# Patient Record
Sex: MALE | Race: WHITE | NOT HISPANIC OR LATINO | URBAN - METROPOLITAN AREA
[De-identification: names, ages, dates, MRNs, and addresses within clinical notes are randomized per-mention and may not be internally consistent; named-entity substitution may affect disease eponyms.]

---

## 2018-07-19 ENCOUNTER — OUTPATIENT (OUTPATIENT)
Dept: OUTPATIENT SERVICES | Facility: HOSPITAL | Age: 68
LOS: 1 days | End: 2018-07-19
Payer: COMMERCIAL

## 2018-07-19 DIAGNOSIS — M54.5 LOW BACK PAIN: ICD-10-CM

## 2018-07-19 PROCEDURE — 64495 INJ PARAVERT F JNT L/S 3 LEV: CPT | Mod: 50

## 2018-07-19 PROCEDURE — 77003 FLUOROGUIDE FOR SPINE INJECT: CPT

## 2018-07-19 PROCEDURE — 64493 INJ PARAVERT F JNT L/S 1 LEV: CPT | Mod: 50

## 2018-07-19 PROCEDURE — 64494 INJ PARAVERT F JNT L/S 2 LEV: CPT | Mod: 50

## 2018-08-02 ENCOUNTER — TRANSCRIPTION ENCOUNTER (OUTPATIENT)
Age: 68
End: 2018-08-02

## 2019-01-24 ENCOUNTER — EMERGENCY (EMERGENCY)
Facility: HOSPITAL | Age: 69
LOS: 0 days | Discharge: HOME | End: 2019-01-24
Attending: EMERGENCY MEDICINE | Admitting: EMERGENCY MEDICINE
Payer: COMMERCIAL

## 2019-01-24 VITALS
SYSTOLIC BLOOD PRESSURE: 118 MMHG | HEART RATE: 77 BPM | RESPIRATION RATE: 13 BRPM | TEMPERATURE: 98 F | DIASTOLIC BLOOD PRESSURE: 76 MMHG | OXYGEN SATURATION: 95 %

## 2019-01-24 VITALS — HEIGHT: 64 IN | WEIGHT: 132.94 LBS

## 2019-01-24 DIAGNOSIS — Z94.84 STEM CELLS TRANSPLANT STATUS: ICD-10-CM

## 2019-01-24 DIAGNOSIS — D64.9 ANEMIA, UNSPECIFIED: ICD-10-CM

## 2019-01-24 DIAGNOSIS — C90.01 MULTIPLE MYELOMA IN REMISSION: ICD-10-CM

## 2019-01-24 DIAGNOSIS — Z88.1 ALLERGY STATUS TO OTHER ANTIBIOTIC AGENTS STATUS: ICD-10-CM

## 2019-01-24 DIAGNOSIS — J01.90 ACUTE SINUSITIS, UNSPECIFIED: ICD-10-CM

## 2019-01-24 DIAGNOSIS — R51 HEADACHE: ICD-10-CM

## 2019-01-24 LAB
ALBUMIN SERPL ELPH-MCNC: 2.9 G/DL — LOW (ref 3.3–5)
ALP SERPL-CCNC: 123 U/L — HIGH (ref 40–120)
ALT FLD-CCNC: 66 U/L — SIGNIFICANT CHANGE UP (ref 12–78)
ANION GAP SERPL CALC-SCNC: 4 MMOL/L — LOW (ref 5–17)
ANISOCYTOSIS BLD QL: SLIGHT — SIGNIFICANT CHANGE UP
APTT BLD: 25.7 SEC — LOW (ref 27.5–36.3)
AST SERPL-CCNC: 40 U/L — HIGH (ref 15–37)
BASOPHILS # BLD AUTO: 0 K/UL — SIGNIFICANT CHANGE UP (ref 0–0.2)
BASOPHILS NFR BLD AUTO: 0 % — SIGNIFICANT CHANGE UP (ref 0–2)
BILIRUB SERPL-MCNC: 0.5 MG/DL — SIGNIFICANT CHANGE UP (ref 0.2–1.2)
BUN SERPL-MCNC: 15 MG/DL — SIGNIFICANT CHANGE UP (ref 7–23)
CALCIUM SERPL-MCNC: 8.4 MG/DL — LOW (ref 8.5–10.1)
CHLORIDE SERPL-SCNC: 100 MMOL/L — SIGNIFICANT CHANGE UP (ref 96–108)
CO2 SERPL-SCNC: 31 MMOL/L — SIGNIFICANT CHANGE UP (ref 22–31)
CREAT SERPL-MCNC: 0.74 MG/DL — SIGNIFICANT CHANGE UP (ref 0.5–1.3)
EOSINOPHIL # BLD AUTO: 0.09 K/UL — SIGNIFICANT CHANGE UP (ref 0–0.5)
EOSINOPHIL NFR BLD AUTO: 2 % — SIGNIFICANT CHANGE UP (ref 0–6)
ERYTHROCYTE [SEDIMENTATION RATE] IN BLOOD: 35 MM/HR — HIGH (ref 0–20)
GLUCOSE SERPL-MCNC: 127 MG/DL — HIGH (ref 70–99)
HCT VFR BLD CALC: 39.6 % — SIGNIFICANT CHANGE UP (ref 39–50)
HGB BLD-MCNC: 13 G/DL — SIGNIFICANT CHANGE UP (ref 13–17)
INR BLD: 1.27 RATIO — HIGH (ref 0.88–1.16)
LYMPHOCYTES # BLD AUTO: 0.7 K/UL — LOW (ref 1–3.3)
LYMPHOCYTES # BLD AUTO: 16 % — SIGNIFICANT CHANGE UP (ref 13–44)
MACROCYTES BLD QL: SLIGHT — SIGNIFICANT CHANGE UP
MANUAL SMEAR VERIFICATION: SIGNIFICANT CHANGE UP
MCHC RBC-ENTMCNC: 31.8 PG — SIGNIFICANT CHANGE UP (ref 27–34)
MCHC RBC-ENTMCNC: 32.8 GM/DL — SIGNIFICANT CHANGE UP (ref 32–36)
MCV RBC AUTO: 96.8 FL — SIGNIFICANT CHANGE UP (ref 80–100)
METAMYELOCYTES # FLD: 2 % — HIGH (ref 0–0)
MICROCYTES BLD QL: SLIGHT — SIGNIFICANT CHANGE UP
MONOCYTES # BLD AUTO: 0.74 K/UL — SIGNIFICANT CHANGE UP (ref 0–0.9)
MONOCYTES NFR BLD AUTO: 17 % — HIGH (ref 2–14)
NEUTROPHILS # BLD AUTO: 2.72 K/UL — SIGNIFICANT CHANGE UP (ref 1.8–7.4)
NEUTROPHILS NFR BLD AUTO: 52 % — SIGNIFICANT CHANGE UP (ref 43–77)
NEUTS BAND # BLD: 10 % — HIGH (ref 0–8)
NRBC # BLD: 0 /100 — SIGNIFICANT CHANGE UP (ref 0–0)
NRBC # BLD: SIGNIFICANT CHANGE UP /100 WBCS (ref 0–0)
OVALOCYTES BLD QL SMEAR: SLIGHT — SIGNIFICANT CHANGE UP
PLAT MORPH BLD: NORMAL — SIGNIFICANT CHANGE UP
PLATELET # BLD AUTO: 118 K/UL — LOW (ref 150–400)
POIKILOCYTOSIS BLD QL AUTO: SLIGHT — SIGNIFICANT CHANGE UP
POTASSIUM SERPL-MCNC: 3.8 MMOL/L — SIGNIFICANT CHANGE UP (ref 3.5–5.3)
POTASSIUM SERPL-SCNC: 3.8 MMOL/L — SIGNIFICANT CHANGE UP (ref 3.5–5.3)
PROT SERPL-MCNC: 6.6 GM/DL — SIGNIFICANT CHANGE UP (ref 6–8.3)
PROTHROM AB SERPL-ACNC: 14.2 SEC — HIGH (ref 10–12.9)
RAPID RVP RESULT: SIGNIFICANT CHANGE UP
RBC # BLD: 4.09 M/UL — LOW (ref 4.2–5.8)
RBC # FLD: 13.9 % — SIGNIFICANT CHANGE UP (ref 10.3–14.5)
RBC BLD AUTO: ABNORMAL
SODIUM SERPL-SCNC: 135 MMOL/L — SIGNIFICANT CHANGE UP (ref 135–145)
VARIANT LYMPHS # BLD: 1 % — SIGNIFICANT CHANGE UP (ref 0–6)
WBC # BLD: 4.38 K/UL — SIGNIFICANT CHANGE UP (ref 3.8–10.5)
WBC # FLD AUTO: 4.38 K/UL — SIGNIFICANT CHANGE UP (ref 3.8–10.5)

## 2019-01-24 PROCEDURE — 70450 CT HEAD/BRAIN W/O DYE: CPT | Mod: 26

## 2019-01-24 PROCEDURE — 93010 ELECTROCARDIOGRAM REPORT: CPT

## 2019-01-24 PROCEDURE — 99284 EMERGENCY DEPT VISIT MOD MDM: CPT

## 2019-01-24 RX ORDER — FLUTICASONE PROPIONATE 50 MCG
1 SPRAY, SUSPENSION NASAL
Qty: 1 | Refills: 0
Start: 2019-01-24 | End: 2019-02-02

## 2019-01-24 RX ORDER — METOCLOPRAMIDE HCL 10 MG
10 TABLET ORAL ONCE
Qty: 0 | Refills: 0 | Status: COMPLETED | OUTPATIENT
Start: 2019-01-24 | End: 2019-01-24

## 2019-01-24 RX ORDER — SODIUM CHLORIDE 9 MG/ML
1000 INJECTION INTRAMUSCULAR; INTRAVENOUS; SUBCUTANEOUS ONCE
Qty: 0 | Refills: 0 | Status: COMPLETED | OUTPATIENT
Start: 2019-01-24 | End: 2019-01-24

## 2019-01-24 RX ORDER — PSEUDOEPHEDRINE HCL 30 MG
1 TABLET ORAL
Qty: 28 | Refills: 0
Start: 2019-01-24 | End: 2019-01-30

## 2019-01-24 RX ADMIN — SODIUM CHLORIDE 1000 MILLILITER(S): 9 INJECTION INTRAMUSCULAR; INTRAVENOUS; SUBCUTANEOUS at 14:30

## 2019-01-24 RX ADMIN — SODIUM CHLORIDE 1000 MILLILITER(S): 9 INJECTION INTRAMUSCULAR; INTRAVENOUS; SUBCUTANEOUS at 15:26

## 2019-01-24 RX ADMIN — Medication 10 MILLIGRAM(S): at 15:48

## 2019-01-24 NOTE — ED ADULT TRIAGE NOTE - CHIEF COMPLAINT QUOTE
Patient sent in by Dr. Trevizo for headache x 1 week.  requesting spinal tap and MRI. Patient c/o nausea, but denies vomiting, fever, blurred vision.

## 2019-01-24 NOTE — ED PROVIDER NOTE - CARE PROVIDER_API CALL
Babak Carrasquillo), Internal Medicine  [address] [place] [address]  Phone: [phone]  Fax: [phone]

## 2019-01-24 NOTE — ED PROVIDER NOTE - MUSCULOSKELETAL, MLM
Spine appears normal, range of motion is not limited, no muscle or joint tenderness. Neck supple, full ROM.

## 2019-01-24 NOTE — ED ADULT NURSE NOTE - OBJECTIVE STATEMENT
Pt complains of headache x 1 week, improved today.  Pt reports history of multiple myeloma and stem cell transplant.  Pt wearing mask.  VS WNL.  Cardiac and VS monitoring initiated.  EKG done on arrival. 20g PIV placed in L forearm.  RVP sent.

## 2019-01-24 NOTE — ED PROVIDER NOTE - PROGRESS NOTE DETAILS
Headache improved -- CT with left sided sinusitis. No intracranial path, labs unremarkable.  No meningeal signs, fever. D/W Julio Cesar-- agrees that patient's symptoms most likely related to sinusitis.  Will d/c on decongestants.  To f/u with PMD

## 2019-01-24 NOTE — ED PROVIDER NOTE - NSFOLLOWUPINSTRUCTIONS_ED_ALL_ED_FT
Flonase and Sudafed to promote sinus drainage.  Acetaminophen for pain.  See printed sinusitis instructions.   Follow-up with your primary care physician.

## 2019-07-10 ENCOUNTER — TRANSCRIPTION ENCOUNTER (OUTPATIENT)
Age: 69
End: 2019-07-10

## 2020-09-15 NOTE — ED PROVIDER NOTE - OBJECTIVE STATEMENT
67 y/o male with a PMHx of multiple myeloma in remission s/p chemotherapy, s/p stem cell transplant, chronic back pain s/p kyphoplasty on oxycodone and Dilaudid presents to the ED c/o HA x1 week. HA was gradual in onset. Pt had no relief of HA with his pain medications or with ASA. Yesterday pt saw neurologist, Dr. Harrell, who gave pt Relpax with some relief of his HA. Pt also with cough so pt is taking Mucinex. No fever, neck stiffness, confusion. Pt sent in by neuro for possibility of having a spinal tap. (4) no impairment

## 2022-08-12 ENCOUNTER — EMERGENCY (EMERGENCY)
Facility: HOSPITAL | Age: 72
LOS: 0 days | Discharge: HOME | End: 2022-08-12
Attending: EMERGENCY MEDICINE
Payer: MEDICARE

## 2022-08-12 VITALS
OXYGEN SATURATION: 97 % | HEART RATE: 94 BPM | SYSTOLIC BLOOD PRESSURE: 141 MMHG | DIASTOLIC BLOOD PRESSURE: 94 MMHG | RESPIRATION RATE: 16 BRPM

## 2022-08-12 VITALS — HEIGHT: 64 IN | WEIGHT: 134.92 LBS

## 2022-08-12 DIAGNOSIS — Z90.49 ACQUIRED ABSENCE OF OTHER SPECIFIED PARTS OF DIGESTIVE TRACT: ICD-10-CM

## 2022-08-12 DIAGNOSIS — Z88.1 ALLERGY STATUS TO OTHER ANTIBIOTIC AGENTS STATUS: ICD-10-CM

## 2022-08-12 DIAGNOSIS — C90.00 MULTIPLE MYELOMA NOT HAVING ACHIEVED REMISSION: ICD-10-CM

## 2022-08-12 DIAGNOSIS — D64.9 ANEMIA, UNSPECIFIED: ICD-10-CM

## 2022-08-12 DIAGNOSIS — Z90.89 ACQUIRED ABSENCE OF OTHER ORGANS: ICD-10-CM

## 2022-08-12 DIAGNOSIS — S61.210A LACERATION WITHOUT FOREIGN BODY OF RIGHT INDEX FINGER WITHOUT DAMAGE TO NAIL, INITIAL ENCOUNTER: ICD-10-CM
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
## 2022-08-12 DIAGNOSIS — Z88.8 ALLERGY STATUS TO OTHER DRUGS, MEDICAMENTS AND BIOLOGICAL SUBSTANCES STATUS: ICD-10-CM
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
## 2022-08-12 PROCEDURE — 12002 RPR S/N/AX/GEN/TRNK2.6-7.5CM: CPT | Mod: F6

## 2022-08-12 PROCEDURE — 12002 RPR S/N/AX/GEN/TRNK2.6-7.5CM: CPT

## 2022-08-12 PROCEDURE — 99283 EMERGENCY DEPT VISIT LOW MDM: CPT | Mod: FS,25

## 2022-08-12 PROCEDURE — 99282 EMERGENCY DEPT VISIT SF MDM: CPT | Mod: 25

## 2022-08-12 NOTE — ED STATDOCS - PATIENT PORTAL LINK FT
You can access the FollowMyHealth Patient Portal offered by [facility] by registering at the following website: [email or web address]. By joining [employer]’s FollowMyHealth portal, you will also be able to view your health information using other applications (apps) compatible with our system.

## 2022-08-12 NOTE — ED STATDOCS - NSICDXPASTSURGICALHX_GEN_ALL_CORE_FT
PAST SURGICAL HISTORY:  H/O carpal tunnel repair 10 years ago    S/P appendectomy     S/P kyphoplasty X2    S/P tonsillectomy

## 2022-08-12 NOTE — ED ADULT NURSE NOTE - CAS TRG GEN SKIN COLOR
Diagnosis: Aplastic Anemia     Protocol/Chemo Regimen: ATG/Cyclosporine/Prednisone (Previously received Solumedrol Days 1-4) Promacta on hold as of today for Grade 3 transaminitis    Day: 24    Pt endorsed: No acute complaints; stable haziness right eye     Review of Systems: Denies nausea, vomiting, diarrhea, chest pain, SOB     Pain scale: Denies     Diet: Regular, no concentrated phos     Allergies: No Known Allergies      ------------------ Normal for race Diagnosis: Aplastic Anemia     Protocol/Chemo Regimen: ATG/Cyclosporine/Prednisone (Previously received Solumedrol Days 1-4) Promacta on hold as of today for Grade 3 transaminitis    Day: 24    Pt endorsed: No acute complaints; stable haziness right eye     Review of Systems: Denies nausea, vomiting, diarrhea, chest pain, SOB     Pain scale: Denies     Diet: Regular, no concentrated phos     Allergies: No Known Allergies    ANTIMICROBIALS  acyclovir   Oral Tab/Cap 400 milliGRAM(s) Oral every 8 hours  atovaquone Suspension 750 milliGRAM(s) Oral every 12 hours  caspofungin IVPB 50 milliGRAM(s) IV Intermittent every 24 hours  levoFLOXacin  Tablet 500 milliGRAM(s) Oral every 24 hours    HEME/ONC MEDICATIONS  eltrombopag 150 milliGRAM(s) Oral daily    STANDING MEDICATIONS  antithymocyte globulin equine Skin Test 0.1 milliLiter(s) IntraDermal once  Biotene Dry Mouth Oral Rinse 5 milliLiter(s) Swish and Spit five times a day  cycloSPORINE  (SandIMMUNE) 500 milliGRAM(s) Oral every 12 hours  diphenhydrAMINE   Injectable 25 milliGRAM(s) IV Push once  famotidine    Tablet 20 milliGRAM(s) Oral two times a day  hydrocortisone sodium succinate Injectable 50 milliGRAM(s) IV Push once  magnesium sulfate  IVPB 2 Gram(s) IV Intermittent once  predniSONE   Tablet 10 milliGRAM(s) Oral daily  sucralfate suspension 1 Gram(s) Oral every 6 hours    PRN MEDICATIONS  acetaminophen   Tablet .. 650 milliGRAM(s) Oral every 6 hours PRN  aluminum hydroxide/magnesium hydroxide/simethicone Suspension 30 milliLiter(s) Oral every 4 hours PRN  diphenhydrAMINE   Injectable 25 milliGRAM(s) IV Push every 12 hours PRN  EPINEPHrine     1 mG/mL Injectable 0.3 milliGRAM(s) IntraMuscular once PRN  hydrocortisone sodium succinate Injectable 50 milliGRAM(s) IV Push every 12 hours PRN  methylPREDNISolone sodium succinate IVPB 500 milliGRAM(s) IV Intermittent once PRN  ondansetron Injectable 4 milliGRAM(s) IV Push every 6 hours PRN    Vital Signs Last 24 Hrs  T(C): 36.4 (16 May 2020 05:29), Max: 36.4 (15 May 2020 22:25)  T(F): 97.5 (16 May 2020 05:29), Max: 97.5 (15 May 2020 22:25)  HR: 84 (16 May 2020 05:29) (74 - 89)  BP: 130/81 (16 May 2020 05:29) (127/74 - 150/80)  BP(mean): --  RR: 18 (16 May 2020 05:29) (17 - 18)  SpO2: 98% (16 May 2020 05:29) (96% - 99%)    PHYSICAL EXAM  General: adult in NAD  HEENT: clear oropharynx, no erythema, no ulcers  Neck: supple  CV: normal S1, S2, RRR  Lungs: clear to auscultation, no wheezes, no rales  Abdomen: soft, nontender, nondistended, normal BS  Ext: no edema  Skin: no rash  Neuro: alert and oriented x 3  Central line: normal     LABS:                        7.0    1.80  )-----------( 46       ( 16 May 2020 07:05 )             20.3     Mean Cell Volume : 85.7 fl  Mean Cell Hemoglobin : 29.5 pg  Mean Cell Hemoglobin Concentration : 34.5 gm/dL  Auto Neutrophil # : 0.06 K/uL  Auto Lymphocyte # : 1.70 K/uL  Auto Monocyte # : 0.03 K/uL  Auto Eosinophil # : 0.00 K/uL  Auto Basophil # : 0.00 K/uL  Auto Neutrophil % : 3.5 %  Auto Lymphocyte % : 94.7 %  Auto Monocyte % : 1.8 %  Auto Eosinophil % : 0.0 %  Auto Basophil % : 0.0 %    05-16  137  |  102  |  18  ----------------------------<  96  4.1   |  25  |  0.87    Ca    8.8      16 May 2020 07:04  Phos  4.8     05-16  Mg     1.4     05-16    TPro  6.0  /  Alb  3.3  /  TBili  2.2<H>  /  DBili  1.1<H>  /  AST  89<H>  /  ALT  190<H>  /  AlkPhos  96  05-16    Mg 1.4  Phos 4.8      Uric Acid 2.6    RADIOLOGY & ADDITIONAL STUDIES:  < from: US Abdomen Upper Quadrant Right (05.04.20 @ 09:45) >  IMPRESSION:   Normal right upper quadrant abdominal ultrasound. Diagnosis: Aplastic Anemia     Protocol/Chemo Regimen: ATG/Cyclosporine/Prednisone (Previously received Solumedrol Days 1-4) Promacta on hold as of today for Grade 3 transaminitis    Day: 24    Pt endorsed: No acute complaints     Review of Systems: Denies nausea, vomiting, diarrhea, chest pain, SOB     Pain scale: Denies     Diet: Regular, no concentrated phos     Allergies: No Known Allergies    ANTIMICROBIALS  acyclovir   Oral Tab/Cap 400 milliGRAM(s) Oral every 8 hours  atovaquone Suspension 750 milliGRAM(s) Oral every 12 hours  caspofungin IVPB 50 milliGRAM(s) IV Intermittent every 24 hours  levoFLOXacin  Tablet 500 milliGRAM(s) Oral every 24 hours    HEME/ONC MEDICATIONS  eltrombopag 150 milliGRAM(s) Oral daily    STANDING MEDICATIONS  antithymocyte globulin equine Skin Test 0.1 milliLiter(s) IntraDermal once  Biotene Dry Mouth Oral Rinse 5 milliLiter(s) Swish and Spit five times a day  cycloSPORINE  (SandIMMUNE) 500 milliGRAM(s) Oral every 12 hours  diphenhydrAMINE   Injectable 25 milliGRAM(s) IV Push once  famotidine    Tablet 20 milliGRAM(s) Oral two times a day  hydrocortisone sodium succinate Injectable 50 milliGRAM(s) IV Push once  magnesium sulfate  IVPB 2 Gram(s) IV Intermittent once  predniSONE   Tablet 10 milliGRAM(s) Oral daily  sucralfate suspension 1 Gram(s) Oral every 6 hours    PRN MEDICATIONS  acetaminophen   Tablet .. 650 milliGRAM(s) Oral every 6 hours PRN  aluminum hydroxide/magnesium hydroxide/simethicone Suspension 30 milliLiter(s) Oral every 4 hours PRN  diphenhydrAMINE   Injectable 25 milliGRAM(s) IV Push every 12 hours PRN  EPINEPHrine     1 mG/mL Injectable 0.3 milliGRAM(s) IntraMuscular once PRN  hydrocortisone sodium succinate Injectable 50 milliGRAM(s) IV Push every 12 hours PRN  methylPREDNISolone sodium succinate IVPB 500 milliGRAM(s) IV Intermittent once PRN  ondansetron Injectable 4 milliGRAM(s) IV Push every 6 hours PRN    Vital Signs Last 24 Hrs  T(C): 36.4 (16 May 2020 05:29), Max: 36.4 (15 May 2020 22:25)  T(F): 97.5 (16 May 2020 05:29), Max: 97.5 (15 May 2020 22:25)  HR: 84 (16 May 2020 05:29) (74 - 89)  BP: 130/81 (16 May 2020 05:29) (127/74 - 150/80)  BP(mean): --  RR: 18 (16 May 2020 05:29) (17 - 18)  SpO2: 98% (16 May 2020 05:29) (96% - 99%)    PHYSICAL EXAM  General: adult in NAD  HEENT: clear oropharynx, no erythema, no ulcers  Neck: supple  CV: normal S1, S2, RRR  Lungs: clear to auscultation, no wheezes, no rales  Abdomen: soft, nontender, nondistended, normal BS  Ext: no edema  Skin: no rash  Neuro: alert and oriented x 3  Central line: normal     LABS:                        7.0    1.80  )-----------( 46       ( 16 May 2020 07:05 )             20.3     Mean Cell Volume : 85.7 fl  Mean Cell Hemoglobin : 29.5 pg  Mean Cell Hemoglobin Concentration : 34.5 gm/dL  Auto Neutrophil # : 0.06 K/uL  Auto Lymphocyte # : 1.70 K/uL  Auto Monocyte # : 0.03 K/uL  Auto Eosinophil # : 0.00 K/uL  Auto Basophil # : 0.00 K/uL  Auto Neutrophil % : 3.5 %  Auto Lymphocyte % : 94.7 %  Auto Monocyte % : 1.8 %  Auto Eosinophil % : 0.0 %  Auto Basophil % : 0.0 %    05-16  137  |  102  |  18  ----------------------------<  96  4.1   |  25  |  0.87    Ca    8.8      16 May 2020 07:04  Phos  4.8     05-16  Mg     1.4     05-16    TPro  6.0  /  Alb  3.3  /  TBili  2.2<H>  /  DBili  1.1<H>  /  AST  89<H>  /  ALT  190<H>  /  AlkPhos  96  05-16    Mg 1.4  Phos 4.8      Uric Acid 2.6    RADIOLOGY & ADDITIONAL STUDIES:  < from: US Abdomen Upper Quadrant Right (05.04.20 @ 09:45) >  IMPRESSION:   Normal right upper quadrant abdominal ultrasound. Diagnosis: Aplastic Anemia     Protocol/Chemo Regimen: ATG/Cyclosporine/Prednisone/Promacta   (Promacta was held 5/1-5/11 d/t transaminitis)     Day: 24    Pt endorsed: chest discomfort during platelet transfusion     Review of Systems: Denies nausea, vomiting, diarrhea, chest pain, SOB     Pain scale: Denies     Diet: Regular, no concentrated phos     Allergies: No Known Allergies    ANTIMICROBIALS  acyclovir   Oral Tab/Cap 400 milliGRAM(s) Oral every 8 hours  atovaquone Suspension 750 milliGRAM(s) Oral every 12 hours  caspofungin IVPB 50 milliGRAM(s) IV Intermittent every 24 hours  levoFLOXacin  Tablet 500 milliGRAM(s) Oral every 24 hours    HEME/ONC MEDICATIONS  eltrombopag 150 milliGRAM(s) Oral daily    STANDING MEDICATIONS  antithymocyte globulin equine Skin Test 0.1 milliLiter(s) IntraDermal once  Biotene Dry Mouth Oral Rinse 5 milliLiter(s) Swish and Spit five times a day  cycloSPORINE  (SandIMMUNE) 500 milliGRAM(s) Oral every 12 hours  diphenhydrAMINE   Injectable 25 milliGRAM(s) IV Push once  famotidine    Tablet 20 milliGRAM(s) Oral two times a day  hydrocortisone sodium succinate Injectable 50 milliGRAM(s) IV Push once  magnesium sulfate  IVPB 2 Gram(s) IV Intermittent once  predniSONE   Tablet 10 milliGRAM(s) Oral daily  sucralfate suspension 1 Gram(s) Oral every 6 hours    PRN MEDICATIONS  acetaminophen   Tablet .. 650 milliGRAM(s) Oral every 6 hours PRN  aluminum hydroxide/magnesium hydroxide/simethicone Suspension 30 milliLiter(s) Oral every 4 hours PRN  diphenhydrAMINE   Injectable 25 milliGRAM(s) IV Push every 12 hours PRN  EPINEPHrine     1 mG/mL Injectable 0.3 milliGRAM(s) IntraMuscular once PRN  hydrocortisone sodium succinate Injectable 50 milliGRAM(s) IV Push every 12 hours PRN  methylPREDNISolone sodium succinate IVPB 500 milliGRAM(s) IV Intermittent once PRN  ondansetron Injectable 4 milliGRAM(s) IV Push every 6 hours PRN    Vital Signs Last 24 Hrs  T(C): 36.4 (16 May 2020 05:29), Max: 36.4 (15 May 2020 22:25)  T(F): 97.5 (16 May 2020 05:29), Max: 97.5 (15 May 2020 22:25)  HR: 84 (16 May 2020 05:29) (74 - 89)  BP: 130/81 (16 May 2020 05:29) (127/74 - 150/80)  BP(mean): --  RR: 18 (16 May 2020 05:29) (17 - 18)  SpO2: 98% (16 May 2020 05:29) (96% - 99%)    PHYSICAL EXAM  General: adult in NAD  HEENT: clear oropharynx, no erythema, no ulcers  Neck: supple  CV: normal S1, S2, RRR  Lungs: clear to auscultation, no wheezes, no rales  Abdomen: soft, nontender, nondistended, normal BS  Ext: no edema  Skin: no rash  Neuro: alert and oriented x 3  Central line: normal     LABS:                        7.0    1.80  )-----------( 46       ( 16 May 2020 07:05 )             20.3     Mean Cell Volume : 85.7 fl  Mean Cell Hemoglobin : 29.5 pg  Mean Cell Hemoglobin Concentration : 34.5 gm/dL  Auto Neutrophil # : 0.06 K/uL  Auto Lymphocyte # : 1.70 K/uL  Auto Monocyte # : 0.03 K/uL  Auto Eosinophil # : 0.00 K/uL  Auto Basophil # : 0.00 K/uL  Auto Neutrophil % : 3.5 %  Auto Lymphocyte % : 94.7 %  Auto Monocyte % : 1.8 %  Auto Eosinophil % : 0.0 %  Auto Basophil % : 0.0 %    05-16  137  |  102  |  18  ----------------------------<  96  4.1   |  25  |  0.87    Ca    8.8      16 May 2020 07:04  Phos  4.8     05-16  Mg     1.4     05-16    TPro  6.0  /  Alb  3.3  /  TBili  2.2<H>  /  DBili  1.1<H>  /  AST  89<H>  /  ALT  190<H>  /  AlkPhos  96  05-16    Mg 1.4  Phos 4.8      Uric Acid 2.6    RADIOLOGY & ADDITIONAL STUDIES:  < from: US Abdomen Upper Quadrant Right (05.04.20 @ 09:45) >  IMPRESSION:   Normal right upper quadrant abdominal ultrasound.

## 2022-08-12 NOTE — ED STATDOCS - OBJECTIVE STATEMENT
71 y/o male presents to the ED c/o lac to R index finger. Pt was cutting with knife which slipped, cutting R index finger. No other complaints. 73 y/o male presents to the ED c/o lac to R index finger. Pt state was cutting with a knife which slipped, cutting R index finger. No other complaints.

## 2022-08-12 NOTE — ED ADULT TRIAGE NOTE - CHIEF COMPLAINT QUOTE
laceration to pointer finger on right hand, cut with large knife. pt not on anticoagulants. arrived with finger wrapped. finger unwrapped by triage RN, approx. 3 inch laceration noted actively bleeding. wound re-dressed and wrapped.

## 2022-08-12 NOTE — ED STATDOCS - PROGRESS NOTE DETAILS
71 yo male presents with R index finger laceration. Pt was sharpening his knife and is unsure if he cut it on the shrpener or the knife. Last tdap unknown; however, due to his medical conditions and him being immunocompromised, his doctors are slowly giving him vaccines and he would prefer to have his doctor give him the vaccines. Laceration over the dorsal aspect of the R index finger, FROM of the PIP, DIP and MCP if the affected finger. Will repair lac and d/c home. -Louis Mullins PA-C

## 2022-08-12 NOTE — ED STATDOCS - NSFOLLOWUPINSTRUCTIONS_ED_ALL_ED_FT
Laceration    WHAT YOU NEED TO KNOW:    A laceration is an injury to the skin and the soft tissue underneath it. Lacerations happen when you are cut or hit by something. They can happen anywhere on the body.     DISCHARGE INSTRUCTIONS:    Return to the emergency department if:     You have heavy bleeding or bleeding that does not stop after 10 minutes of holding firm, direct pressure over the wound.       Your wound opens up.     Contact your healthcare provider if:     You have a fever or chills.       Your laceration is red, warm, or swollen.      You have red streaks on your skin coming from your wound.      You have white or yellow drainage from the wound that smells bad.      You have pain that gets worse, even after treatment.       You have questions or concerns about your condition or care.     Medicines:     Prescription pain medicine may be given. Ask how to take this medicine safely.       Antibiotics help treat or prevent a bacterial infection.       Take your medicine as directed. Contact your healthcare provider if you think your medicine is not helping or if you have side effects. Tell him or her if you are allergic to any medicine. Keep a list of the medicines, vitamins, and herbs you take. Include the amounts, and when and why you take them. Bring the list or the pill bottles to follow-up visits. Carry your medicine list with you in case of an emergency.    Care for your wound as directed:     Do not get your wound wet until your healthcare provider says it is okay. Do not soak your wound in water. Do not go swimming until your healthcare provider says it is okay. Carefully wash the wound with soap and water. Gently pat the area dry or allow it to air dry.       Change your bandages when they get wet, dirty, or after washing. Apply new, clean bandages as directed. Do not apply elastic bandages or tape too tight. Do not put powders or lotions over your incision.       Apply antibiotic ointment as directed. Your healthcare provider may give you antibiotic ointment to put over your wound if you have stitches. If you have strips of tape over your incision, let them dry up and fall off on their own. If they do not fall off within 14 days, gently remove them. If you have glue over your wound, do not remove or pick at it. If your glue comes off, do not replace it with glue that you have at home.       Check your wound every day for signs of infection such as swelling, redness, or pus.     Self-care:     Apply ice on your wound for 15 to 20 minutes every hour or as directed. Use an ice pack, or put crushed ice in a plastic bag. Cover it with a towel. Ice helps prevent tissue damage and decreases swelling and pain.      Use a splint as directed. A splint will decrease movement and stress on your wound. It may help it heal faster. A splint may be used for lacerations over joints or areas of your body that bend. Ask your healthcare provider how to apply and remove a splint.       Decrease scarring of your wound by applying ointments as directed. Do not apply ointments until your healthcare provider says it is okay. You may need to wait until your wound is healed. Ask which ointment to buy and how often to use it. After your wound is healed, use sunscreen over the area when you are out in the sun. You should do this for at least 6 months to 1 year after your injury.     Follow up with your healthcare provider as directed: You may need to follow up in 24 to 48 hours to have your wound checked for infection. You will need to return in 14 days if you have stitches or staples so they can be removed. Care for your wound as directed to prevent infection and help it heal. Write down your questions so you remember to ask them during your visits.

## 2022-08-12 NOTE — ED STATDOCS - SKIN, MLM
4cm laceration to dorsal aspect of right pointer finger. neurovascularly intact visible approx 4cm laceration to dorsal aspect of right pointer finger. neurovascularly intact

## 2022-08-12 NOTE — ED STATDOCS - NS ED ATTENDING STATEMENT MOD
This was a shared visit with the FILOMENA. I reviewed and verified the documentation and independently performed the documented:

## 2022-08-12 NOTE — ED ADULT TRIAGE NOTE - HEIGHT IN INCHES
4 Slit Excision Additional Text (Leave Blank If You Do Not Want): A linear line was drawn on the skin overlying the lesion. An incision was made slowly until the lesion was visualized.  Once visualized, the lesion was removed with blunt dissection.

## 2022-08-12 NOTE — ED STATDOCS - NSICDXFAMILYHX_GEN_ALL_CORE_FT
FAMILY HISTORY:  Mother  Still living? No  Family history of cervical cancer, Age at diagnosis: Age Unknown

## 2022-08-12 NOTE — ED STATDOCS - ATTENDING APP SHARED VISIT CONTRIBUTION OF CARE
I, Ethan Liu, performed the initial face to face bedside interview with this patient regarding history of present illness, review of symptoms and relevant past medical, social and family history.  I completed an independent physical examination.  I was the initial provider who evaluated this patient. I have signed out the follow up of any pending tests (i.e. labs, radiological studies) to the FILOMENA.  I have communicated the patient’s plan of care and disposition with the FILOMENA.  The history, relevant review of systems, past medical and surgical history, medical decision making, and physical examination was documented by the scribe in my presence and I attest to the accuracy of the documentation.     pt with lac to right index finger from accidental cut from a knife.   scant active bleeding from approx 4 cm lac .   NVI.   UTD with tetanus from PMD.   will suture and have f/u

## 2022-08-13 NOTE — ED PROCEDURE NOTE - NS ED ATTENDING STATEMENT MOD
This was a shared visit with the FILOMENA. I reviewed and verified the documentation and independently performed the documented:
This was a shared visit with the FILOMENA. I reviewed and verified the documentation and independently performed the documented:

## 2022-08-13 NOTE — ED PROCEDURE NOTE - CPROC ED TIME OUT STATEMENT1
“Patient's name, , procedure and correct site were confirmed during the [place] Timeout.”
“Patient's name, , procedure and correct site were confirmed during the [place] Timeout.”

## 2022-08-27 ENCOUNTER — EMERGENCY (EMERGENCY)
Facility: HOSPITAL | Age: 72
LOS: 0 days | Discharge: HOME | End: 2022-08-27
Attending: HOSPITALIST
Payer: MEDICARE

## 2022-08-27 VITALS
TEMPERATURE: 98 F | OXYGEN SATURATION: 97 % | RESPIRATION RATE: 17 BRPM | HEART RATE: 97 BPM | SYSTOLIC BLOOD PRESSURE: 147 MMHG | DIASTOLIC BLOOD PRESSURE: 89 MMHG

## 2022-08-27 VITALS — WEIGHT: 130.07 LBS | HEIGHT: 64 IN

## 2022-08-27 DIAGNOSIS — S61.212D LACERATION WITHOUT FOREIGN BODY OF RIGHT MIDDLE FINGER WITHOUT DAMAGE TO NAIL, SUBSEQUENT ENCOUNTER: ICD-10-CM

## 2022-08-27 DIAGNOSIS — Z48.02 ENCOUNTER FOR REMOVAL OF SUTURES: ICD-10-CM
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
## 2022-08-27 PROCEDURE — 99212 OFFICE O/P EST SF 10 MIN: CPT

## 2022-08-27 PROCEDURE — L9995: CPT

## 2022-08-27 NOTE — ED STATDOCS - OBJECTIVE STATEMENT
73 yo male w/PMHx of multiple myeloma presents to the ED for suture removal. Pt had laceration to right third digit. Stiches placed 15 days ago will remove.

## 2022-08-27 NOTE — ED STATDOCS - PATIENT PORTAL LINK FT
You can access the FollowMyHealth Patient Portal offered by [facility] by registering at the following website: [email or web address]. By joining [employer]’s FollowMyHealth portal, you will also be able to view your health information using other applications (apps) compatible with our system.

## 2022-08-27 NOTE — ED STATDOCS - NS_ ATTENDINGSCRIBEDETAILS _ED_A_ED_FT
Kim Amaya MD: The history, relevant review of systems, past medical and surgical history, medical decision making, and physical examination was documented by the scribe in my presence and I attest to the accuracy of the documentation.

## 2023-02-15 NOTE — ED ADULT NURSE NOTE - CHPI ED NUR SYMPTOMS NEG
Eliquis Approved    Filling Pharmacy: [facility]  Additional Information: Pharmacy contacted - received paid claim.  Pharmacy will contact patient when medication is ready to be picked up.            no weakness/no fever/no blurred vision/no change in level of consciousness

## 2023-05-19 ENCOUNTER — RESULT REVIEW (OUTPATIENT)
Age: 73
End: 2023-05-19

## 2023-05-19 ENCOUNTER — APPOINTMENT (OUTPATIENT)
Dept: NEUROLOGY | Facility: CLINIC | Age: 73
End: 2023-05-19
Payer: MEDICARE

## 2023-05-19 VITALS
WEIGHT: 134 LBS | TEMPERATURE: 98.5 F | BODY MASS INDEX: 22.33 KG/M2 | HEIGHT: 65 IN | SYSTOLIC BLOOD PRESSURE: 111 MMHG | HEART RATE: 88 BPM | DIASTOLIC BLOOD PRESSURE: 70 MMHG

## 2023-05-19 DIAGNOSIS — R41.3 OTHER AMNESIA: ICD-10-CM

## 2023-05-19 PROCEDURE — 99204 OFFICE O/P NEW MOD 45 MIN: CPT

## 2023-05-19 RX ORDER — DEXTROAMPHETAMINE SACCHARATE, AMPHETAMINE ASPARTATE, DEXTROAMPHETAMINE SULFATE, AND AMPHETAMINE SULFATE 2.5; 2.5; 2.5; 2.5 MG/1; MG/1; MG/1; MG/1
10 TABLET ORAL DAILY
Refills: 0 | Status: ACTIVE | COMMUNITY

## 2023-05-19 RX ORDER — METHADONE HYDROCHLORIDE 5 MG/1
TABLET ORAL 3 TIMES DAILY
Refills: 0 | Status: ACTIVE | COMMUNITY

## 2023-05-19 RX ORDER — BENDAMUSTINE HYDROCHLORIDE 25 MG/ML
INJECTION, SOLUTION INTRAVENOUS
Refills: 0 | Status: ACTIVE | COMMUNITY

## 2023-05-19 RX ORDER — ALBUTEROL 90 MCG
90 AEROSOL (GRAM) INHALATION
Refills: 0 | Status: ACTIVE | COMMUNITY

## 2023-05-19 RX ORDER — OXYCODONE HYDROCHLORIDE 30 MG/1
30 TABLET ORAL
Refills: 0 | Status: ACTIVE | COMMUNITY

## 2023-05-19 RX ORDER — ALPRAZOLAM 0.5 MG/1
0.5 TABLET ORAL
Refills: 0 | Status: ACTIVE | COMMUNITY

## 2023-05-19 RX ORDER — BORTEZOMIB 3.5 MG/1
INJECTION, POWDER, LYOPHILIZED, FOR SOLUTION INTRAVENOUS; SUBCUTANEOUS
Refills: 0 | Status: ACTIVE | COMMUNITY

## 2023-05-19 RX ORDER — CYCLOBENZAPRINE HCL 10 MG
10 TABLET ORAL DAILY
Refills: 0 | Status: ACTIVE | COMMUNITY

## 2023-05-19 RX ORDER — OMEPRAZOLE MAGNESIUM 20 MG/1
20 CAPSULE, DELAYED RELEASE ORAL
Refills: 0 | Status: ACTIVE | COMMUNITY

## 2023-05-19 RX ORDER — VALACYCLOVIR HYDROCHLORIDE 500 MG/1
500 TABLET, FILM COATED ORAL TWICE DAILY
Refills: 0 | Status: ACTIVE | COMMUNITY

## 2023-05-19 NOTE — REVIEW OF SYSTEMS
[Confused or Disoriented] : no confusion [Memory Lapses or Loss] : memory loss [Decr. Concentrating Ability] : decreased concentrating ability [Difficulty with Language] : no ~M difficulty with language [Changed Thought Patterns] : changed thought patterns [Repeating Questions] : repeated questioning about recent events [Arm Weakness] : no arm weakness [Hand Weakness] : no hand weakness [Leg Weakness] : no leg weakness [Poor Coordination] : good coordination [Difficulty Writing] : no difficulty writing [Difficulties in Speech] : no speech difficulties [Numbness] : no numbness [Abnormal Sensation] : no abnormal sensation [Seizures] : no convulsions [Fainting] : no fainting [Migraine Headache] : no migraine headache [Difficulty Walking] : no difficulty walking [Frequent Falls] : not falling

## 2023-05-19 NOTE — PHYSICAL EXAM
[Total Score ___ / 30] : the patient achieved a score of [unfilled] /30 [Date / Time ___ / 5] : date / time [unfilled] / 5 [Place ___ / 5] : place [unfilled] / 5 [Registration ___ / 3] : registration [unfilled] / 3 [Serial Sevens ___/5] : serial sevens [unfilled] / 5 [Naming 2 Objects ___ / 2] : naming two objects [unfilled] / 2 [Repeating a Sentence ___ / 1] : repeating a sentence [unfilled] / 1 [Writing a Sentence ___ / 1] : write sentence [unfilled] / 1 [3-stage Verbal Command ___ / 3] : three-stage verbal command [unfilled] / 3 [Written Command ___ / 1] : written command [unfilled] / 1 [Copy a Design ___ / 1] : copy a design [unfilled] / 1 [Recall ___ / 3] : recall [unfilled] / 3 [Person] : oriented to person [Place] : disoriented to place [Time] : disoriented to time [Concentration Intact] : normal concentrating ability [Visual Intact] : visual attention was ~T not ~L decreased [Naming Objects] : no difficulty naming common objects [Repeating Phrases] : no difficulty repeating a phrase [Writing A Sentence] : no difficulty writing a sentence [Fluency] : fluency intact [Comprehension] : comprehension intact [Reading] : reading intact [Past History] : adequate knowledge of personal past history [Cranial Nerves Optic (II)] : visual acuity intact bilaterally,  visual fields full to confrontation, pupils equal round and reactive to light [Cranial Nerves Oculomotor (III)] : extraocular motion intact [Cranial Nerves Trigeminal (V)] : facial sensation intact symmetrically [Cranial Nerves Facial (VII)] : face symmetrical [Cranial Nerves Vestibulocochlear (VIII)] : hearing was intact bilaterally [Cranial Nerves Glossopharyngeal (IX)] : tongue and palate midline [Cranial Nerves Accessory (XI - Cranial And Spinal)] : head turning and shoulder shrug symmetric [Cranial Nerves Hypoglossal (XII)] : there was no tongue deviation with protrusion [Motor Tone] : muscle tone was normal in all four extremities [Motor Strength] : muscle strength was normal in all four extremities [No Muscle Atrophy] : normal bulk in all four extremities [Motor Handedness Right-Handed] : the patient is right hand dominant [Paresis Pronator Drift Right-Sided] : no pronator drift on the right [Paresis Pronator Drift Left-Sided] : no pronator drift on the left [Sensation Tactile Decrease] : light touch was intact [Romberg's Sign] : Romberg's sign was negtive [Abnormal Walk] : normal gait [Past-pointing] : there was no past-pointing [Balance] : balance was intact [Tremor] : no tremor present [Dysdiadochokinesia Bilaterally] : not present [Coordination - Dysmetria Impaired Finger-to-Nose Bilateral] : not present [Coordination - Dysmetria Impaired Heel-to-Shin Bilateral] : not present [Plantar Reflex Right Only] : normal on the right [2+] : Ankle jerk left 2+ [Plantar Reflex Left Only] : normal on the left [Neck Appearance] : the appearance of the neck was normal [] : the neck was supple [Neck Cervical Mass (___cm)] : no neck mass was observed [Arterial Pulses Carotid] : carotid pulses were normal with no bruits [Edema] : there was no peripheral edema

## 2023-05-19 NOTE — DISCUSSION/SUMMARY
[FreeTextEntry1] : 74-year-old man with a history of multiple myeloma, chronic pain followed by pain management, has noted over the last 5 months some deterioration in ability to hold memory, increasingly forgetful less capable of doing previously learned activities such as using a tool at home.\par Examination is shows evidence of cognitive deficits, but otherwise nonfocal.\par Plan: We will order an MRI of the brain, preferably with contrast. As per patient wife was told not to use contrast so we will try to find information from his oncologist office.\par Electroencephalogram.\par Try to obtain copies of recent blood work.\par Return after the above.

## 2023-05-19 NOTE — HISTORY OF PRESENT ILLNESS
[FreeTextEntry1] : \par 73-year-old man with a history of multiple myeloma, remission, status post bone marrow transplant.  Accompanied by his wife, with complaint of memory loss difficulty retaining information since the beginning of the year.  As per him and his wife, throughout last year and no issues recalling the conversation, the topic of the conversation things medications, appointments and the beginning of the year sudden change.  No preceding trauma or illness, the only thing of note is any change in some of his pain medications to methadone.  About 2 weeks ago was switched back to previous medications, taken off methadone now back on methadone.\par Very forgetful, wife reports to her conversation and findings of repeating the same conversation again again again, given the same instructions again again again.  Is also had more difficulty doing things that he would otherwise no doing very well.  Is a retired , able to fix do anything in the home now having difficulty handling tools.  Still able to dress himself, very physically active.  To the limits of his underlying pain.\par Denies headaches, no dizzy spells, no recurrent falls no injuries.  No difficulty with language, no difficulty with reading over the understands verbal or written speech.\par No hallucinations, no trouble sleeping which she does Екатерина.  On Adderall to keep him awake.\par

## 2023-05-19 NOTE — DATA REVIEWED
[de-identified] :  EXAM:  CT BRAIN                      \par \par \par PROCEDURE DATE:  01/24/2019  \par \par \par \par INTERPRETATION:  Exam Date: 1/24/2019 2:49 PM\par \par CT head without IV contrast\par \par CLINICAL INFORMATION:  headache    \par \par TECHNIQUE: Contiguous axial sections were obtained through the head.   \par This scan was performed using automatic exposure control (radiation dose \par reduction software) to obtain a diagnostic image quality scan with \par patient dose as low as reasonably achievable.  \par \par COMPARISON: None\par \par \par FINDINGS:   \par \par There is no evidence of intraparenchymal or extraaxial hemorrhage.   \par There is no CT evidence of large vessel acute infarct. No mass effect is \par found in the brain.  No evidence of midline shift or herniation pattern.\par \par The ventricles, sulci and basal cisterns appear unremarkable.     \par \par Extensive mucosal inflammation within the ethmoid and sphenoid sinuses \par and within the visualized portion of the left maxillary sinus, with \par aerosolized secretions within the left sphenoid sinus, suggestive of an \par acute sinusitis, may be cause of patient's headache.\par \par No focal bony lesions are identified.\par \par \par IMPRESSION:   \par \par No acute intracranial findings.\par \par Extensive mucosal inflammation within the ethmoid and sphenoid sinuses \par and within the visualized portion of the left maxillary sinus, with \par aerosolized secretions within the left sphenoid sinus, suggestive of an \par acute sinusitis, may be cause of patient's headache.\par \par No focal bony lesions are identified.\par \par \par \par \par \par \par \par \par ZAHEER RIOS M.D., ATTENDING RADIOLOGIST\par This document has been electronically signed. Jan 24 2019  3:03PM\par   \par \par

## 2023-05-23 ENCOUNTER — APPOINTMENT (OUTPATIENT)
Dept: NEUROLOGY | Facility: CLINIC | Age: 73
End: 2023-05-23
Payer: MEDICARE

## 2023-05-23 PROCEDURE — 95816 EEG AWAKE AND DROWSY: CPT

## 2023-05-31 ENCOUNTER — APPOINTMENT (OUTPATIENT)
Dept: MRI IMAGING | Facility: CLINIC | Age: 73
End: 2023-05-31
Payer: MEDICARE

## 2023-05-31 ENCOUNTER — OUTPATIENT (OUTPATIENT)
Dept: OUTPATIENT SERVICES | Facility: HOSPITAL | Age: 73
LOS: 1 days | End: 2023-05-31
Payer: MEDICARE

## 2023-05-31 DIAGNOSIS — R41.3 OTHER AMNESIA: ICD-10-CM

## 2023-05-31 PROCEDURE — 70553 MRI BRAIN STEM W/O & W/DYE: CPT | Mod: 26,MH

## 2023-05-31 PROCEDURE — 70553 MRI BRAIN STEM W/O & W/DYE: CPT

## 2023-05-31 PROCEDURE — A9585: CPT

## 2023-06-01 ENCOUNTER — NON-APPOINTMENT (OUTPATIENT)
Age: 73
End: 2023-06-01

## 2023-07-24 ENCOUNTER — APPOINTMENT (OUTPATIENT)
Dept: NEUROLOGY | Facility: CLINIC | Age: 73
End: 2023-07-24
Payer: MEDICARE

## 2023-07-24 VITALS
HEART RATE: 87 BPM | TEMPERATURE: 97.6 F | BODY MASS INDEX: 24.85 KG/M2 | DIASTOLIC BLOOD PRESSURE: 74 MMHG | WEIGHT: 142 LBS | SYSTOLIC BLOOD PRESSURE: 134 MMHG | HEIGHT: 63.5 IN

## 2023-07-24 PROCEDURE — 99213 OFFICE O/P EST LOW 20 MIN: CPT

## 2023-07-24 NOTE — DATA REVIEWED
[de-identified] : EEG performed May 23, 2023 impression: Abnormal EEG performed with patient awake, drowsy is a diffuse renal dysfunction.

## 2023-07-24 NOTE — DISCUSSION/SUMMARY
[FreeTextEntry1] : 74-year-old man history of myeloma, with worsening memory, evidence of mild cognitive dysfunction.  Recent MRI of the brain unrevealing.  EEG mildly slow.\par Plan: We will order a brain PET scan rule out Alzheimer's.\par Computerized based cognitive study.\par Return after the above.

## 2023-07-24 NOTE — HISTORY OF PRESENT ILLNESS
[FreeTextEntry1] : 73-year-old male, here by his wife.  Noted since beginning of the year, worsening memory, forgetful, repeating questions, and attempted.  Recent evaluation including an MRI of the brain showed microvascular changes.  No acute changes.  EEG demonstrated mild slowing.  No epileptiform activity.\par No fears of blackouts, no episodes of disorientation or recurrent seizures.\par Denies any transient episodes of unilateral weakness numbness of the face or extremities, no change in vision.  Denies any hallucinations, no increasing anxiety or depression.

## 2023-07-24 NOTE — PHYSICAL EXAM
[General Appearance - Alert] : alert [General Appearance - In No Acute Distress] : in no acute distress [Person] : oriented to person [Place] : disoriented to place [Time] : disoriented to time [Concentration Intact] : normal concentrating ability [Visual Intact] : visual attention was ~T not ~L decreased [Naming Objects] : no difficulty naming common objects [Repeating Phrases] : no difficulty repeating a phrase [Writing A Sentence] : no difficulty writing a sentence [Fluency] : fluency intact [Comprehension] : comprehension intact [Reading] : reading intact [Past History] : adequate knowledge of personal past history [Total Score ___ / 30] : the patient achieved a score of [unfilled] /30 [Date / Time ___ / 5] : date / time [unfilled] / 5 [Place ___ / 5] : place [unfilled] / 5 [Registration ___ / 3] : registration [unfilled] / 3 [Serial Sevens ___/5] : serial sevens [unfilled] / 5 [Naming 2 Objects ___ / 2] : naming two objects [unfilled] / 2 [Repeating a Sentence ___ / 1] : repeating a sentence [unfilled] / 1 [Writing a Sentence ___ / 1] : write sentence [unfilled] / 1 [3-stage Verbal Command ___ / 3] : three-stage verbal command [unfilled] / 3 [Written Command ___ / 1] : written command [unfilled] / 1 [Copy a Design ___ / 1] : copy a design [unfilled] / 1 [Recall ___ / 3] : recall [unfilled] / 3 [Cranial Nerves Optic (II)] : visual acuity intact bilaterally,  visual fields full to confrontation, pupils equal round and reactive to light [Cranial Nerves Oculomotor (III)] : extraocular motion intact [Cranial Nerves Trigeminal (V)] : facial sensation intact symmetrically [Cranial Nerves Facial (VII)] : face symmetrical [Cranial Nerves Vestibulocochlear (VIII)] : hearing was intact bilaterally [Cranial Nerves Accessory (XI - Cranial And Spinal)] : head turning and shoulder shrug symmetric [Cranial Nerves Hypoglossal (XII)] : there was no tongue deviation with protrusion [Motor Tone] : muscle tone was normal in all four extremities [Motor Strength] : muscle strength was normal in all four extremities [No Muscle Atrophy] : normal bulk in all four extremities [Motor Handedness Right-Handed] : the patient is right hand dominant [Paresis Pronator Drift Right-Sided] : no pronator drift on the right [Paresis Pronator Drift Left-Sided] : no pronator drift on the left [Sensation Tactile Decrease] : light touch was intact [Romberg's Sign] : Romberg's sign was negtive [Abnormal Walk] : normal gait [Balance] : balance was intact [Past-pointing] : there was no past-pointing [Tremor] : no tremor present [Dysdiadochokinesia Bilaterally] : not present [Coordination - Dysmetria Impaired Finger-to-Nose Bilateral] : not present [Coordination - Dysmetria Impaired Heel-to-Shin Bilateral] : not present

## 2023-07-24 NOTE — REVIEW OF SYSTEMS
[As Noted in HPI] : as noted in HPI [Memory Lapses or Loss] : memory loss [Decr. Concentrating Ability] : decreased concentrating ability [Changed Thought Patterns] : changed thought patterns [Negative] : Heme/Lymph

## 2023-09-22 ENCOUNTER — APPOINTMENT (OUTPATIENT)
Dept: NUCLEAR MEDICINE | Facility: CLINIC | Age: 73
End: 2023-09-22
Payer: MEDICARE

## 2023-09-22 ENCOUNTER — RESULT REVIEW (OUTPATIENT)
Age: 73
End: 2023-09-22

## 2023-09-22 ENCOUNTER — APPOINTMENT (OUTPATIENT)
Dept: MRI IMAGING | Facility: CLINIC | Age: 73
End: 2023-09-22
Payer: MEDICARE

## 2023-09-22 ENCOUNTER — OUTPATIENT (OUTPATIENT)
Dept: OUTPATIENT SERVICES | Facility: HOSPITAL | Age: 73
LOS: 1 days | End: 2023-09-22
Payer: MEDICARE

## 2023-09-22 DIAGNOSIS — R41.3 OTHER AMNESIA: ICD-10-CM

## 2023-09-22 PROCEDURE — 70553 MRI BRAIN STEM W/O & W/DYE: CPT | Mod: 26,MH

## 2023-09-22 PROCEDURE — A9552: CPT

## 2023-09-22 PROCEDURE — A9585: CPT

## 2023-09-22 PROCEDURE — 78999 UNLISTED MISC PX DX NUC MED: CPT

## 2023-09-22 PROCEDURE — 70553 MRI BRAIN STEM W/O & W/DYE: CPT

## 2023-09-22 PROCEDURE — 78608 BRAIN IMAGING (PET): CPT

## 2023-09-22 PROCEDURE — 78999 UNLISTED MISC PX DX NUC MED: CPT | Mod: 26,MH

## 2023-09-22 PROCEDURE — 78608 BRAIN IMAGING (PET): CPT | Mod: 26,MH

## 2023-10-16 ENCOUNTER — APPOINTMENT (OUTPATIENT)
Dept: NEUROLOGY | Facility: CLINIC | Age: 73
End: 2023-10-16
Payer: MEDICARE

## 2023-10-16 VITALS
SYSTOLIC BLOOD PRESSURE: 127 MMHG | BODY MASS INDEX: 24.41 KG/M2 | WEIGHT: 143 LBS | HEART RATE: 94 BPM | HEIGHT: 64 IN | DIASTOLIC BLOOD PRESSURE: 74 MMHG

## 2023-10-16 DIAGNOSIS — F02.80 ALZHEIMER'S DISEASE, UNSPECIFIED: ICD-10-CM

## 2023-10-16 DIAGNOSIS — G30.9 ALZHEIMER'S DISEASE, UNSPECIFIED: ICD-10-CM

## 2023-10-16 PROCEDURE — 99214 OFFICE O/P EST MOD 30 MIN: CPT

## 2024-04-15 NOTE — ED STATDOCS - NSICDXPASTSURGICALHX_GEN_ALL_CORE_FT
16-Apr-2024 02:30
PAST SURGICAL HISTORY:  H/O carpal tunnel repair 10 years ago    S/P appendectomy     S/P kyphoplasty X2    S/P tonsillectomy

## 2024-04-29 ENCOUNTER — INPATIENT (INPATIENT)
Facility: HOSPITAL | Age: 74
LOS: 1 days | Discharge: HOME | DRG: 871 | End: 2024-05-01
Attending: STUDENT IN AN ORGANIZED HEALTH CARE EDUCATION/TRAINING PROGRAM | Admitting: INTERNAL MEDICINE
Payer: MEDICARE

## 2024-04-29 VITALS
TEMPERATURE: 101 F | DIASTOLIC BLOOD PRESSURE: 73 MMHG | WEIGHT: 153.88 LBS | OXYGEN SATURATION: 94 % | RESPIRATION RATE: 18 BRPM | SYSTOLIC BLOOD PRESSURE: 132 MMHG | HEART RATE: 118 BPM

## 2024-04-29 DIAGNOSIS — J18.9 PNEUMONIA, UNSPECIFIED ORGANISM: ICD-10-CM

## 2024-04-29 LAB
ALBUMIN SERPL ELPH-MCNC: 3.4 G/DL — SIGNIFICANT CHANGE UP (ref 3.3–5)
ALP SERPL-CCNC: 71 U/L — SIGNIFICANT CHANGE UP (ref 40–120)
ALT FLD-CCNC: 28 U/L — SIGNIFICANT CHANGE UP (ref 12–78)
ANION GAP SERPL CALC-SCNC: 5 MMOL/L — SIGNIFICANT CHANGE UP (ref 5–17)
ANISOCYTOSIS BLD QL: SLIGHT — SIGNIFICANT CHANGE UP
APPEARANCE UR: CLEAR — SIGNIFICANT CHANGE UP
APTT BLD: 20.9 SEC — LOW (ref 24.5–35.6)
AST SERPL-CCNC: 36 U/L — SIGNIFICANT CHANGE UP (ref 15–37)
BASE EXCESS BLDV CALC-SCNC: -2.7 MMOL/L — LOW (ref -2–3)
BASOPHILS # BLD AUTO: 0 K/UL — SIGNIFICANT CHANGE UP (ref 0–0.2)
BASOPHILS NFR BLD AUTO: 0 % — SIGNIFICANT CHANGE UP (ref 0–2)
BILIRUB SERPL-MCNC: 0.3 MG/DL — SIGNIFICANT CHANGE UP (ref 0.2–1.2)
BILIRUB UR-MCNC: NEGATIVE — SIGNIFICANT CHANGE UP
BUN SERPL-MCNC: 21 MG/DL — SIGNIFICANT CHANGE UP (ref 7–23)
CALCIUM SERPL-MCNC: 8.9 MG/DL — SIGNIFICANT CHANGE UP (ref 8.5–10.1)
CHLORIDE SERPL-SCNC: 105 MMOL/L — SIGNIFICANT CHANGE UP (ref 96–108)
CO2 SERPL-SCNC: 26 MMOL/L — SIGNIFICANT CHANGE UP (ref 22–31)
COLOR SPEC: YELLOW — SIGNIFICANT CHANGE UP
CREAT SERPL-MCNC: 0.92 MG/DL — SIGNIFICANT CHANGE UP (ref 0.5–1.3)
CRP SERPL-MCNC: 21 MG/L — HIGH
D DIMER BLD IA.RAPID-MCNC: 3337 NG/ML DDU — HIGH
DACRYOCYTES BLD QL SMEAR: SLIGHT — SIGNIFICANT CHANGE UP
DIFF PNL FLD: NEGATIVE — SIGNIFICANT CHANGE UP
EGFR: 88 ML/MIN/1.73M2 — SIGNIFICANT CHANGE UP
EOSINOPHIL # BLD AUTO: 0 K/UL — SIGNIFICANT CHANGE UP (ref 0–0.5)
EOSINOPHIL NFR BLD AUTO: 0 % — SIGNIFICANT CHANGE UP (ref 0–6)
FERRITIN SERPL-MCNC: 1125 NG/ML — HIGH (ref 30–400)
FLUAV AG NPH QL: SIGNIFICANT CHANGE UP
FLUBV AG NPH QL: SIGNIFICANT CHANGE UP
GAS PNL BLDV: SIGNIFICANT CHANGE UP
GLUCOSE SERPL-MCNC: 133 MG/DL — HIGH (ref 70–99)
GLUCOSE UR QL: NEGATIVE MG/DL — SIGNIFICANT CHANGE UP
HCO3 BLDV-SCNC: 24 MMOL/L — SIGNIFICANT CHANGE UP (ref 22–29)
HCT VFR BLD CALC: 31.9 % — LOW (ref 39–50)
HGB BLD-MCNC: 9.9 G/DL — LOW (ref 13–17)
INR BLD: 0.99 RATIO — SIGNIFICANT CHANGE UP (ref 0.85–1.18)
KETONES UR-MCNC: NEGATIVE MG/DL — SIGNIFICANT CHANGE UP
LACTATE SERPL-SCNC: 1.4 MMOL/L — SIGNIFICANT CHANGE UP (ref 0.7–2)
LACTATE SERPL-SCNC: 1.9 MMOL/L — SIGNIFICANT CHANGE UP (ref 0.7–2)
LDH SERPL L TO P-CCNC: 201 U/L — SIGNIFICANT CHANGE UP (ref 84–241)
LEUKOCYTE ESTERASE UR-ACNC: NEGATIVE — SIGNIFICANT CHANGE UP
LYMPHOCYTES # BLD AUTO: 0.31 K/UL — LOW (ref 1–3.3)
LYMPHOCYTES # BLD AUTO: 5 % — LOW (ref 13–44)
MACROCYTES BLD QL: SLIGHT — SIGNIFICANT CHANGE UP
MAGNESIUM SERPL-MCNC: 1.8 MG/DL — SIGNIFICANT CHANGE UP (ref 1.6–2.6)
MANUAL SMEAR VERIFICATION: SIGNIFICANT CHANGE UP
MCHC RBC-ENTMCNC: 28.9 PG — SIGNIFICANT CHANGE UP (ref 27–34)
MCHC RBC-ENTMCNC: 31 GM/DL — LOW (ref 32–36)
MCV RBC AUTO: 93 FL — SIGNIFICANT CHANGE UP (ref 80–100)
MICROCYTES BLD QL: SLIGHT — SIGNIFICANT CHANGE UP
MONOCYTES # BLD AUTO: 0.37 K/UL — SIGNIFICANT CHANGE UP (ref 0–0.9)
MONOCYTES NFR BLD AUTO: 6 % — SIGNIFICANT CHANGE UP (ref 2–14)
NEUTROPHILS # BLD AUTO: 5.46 K/UL — SIGNIFICANT CHANGE UP (ref 1.8–7.4)
NEUTROPHILS NFR BLD AUTO: 76 % — SIGNIFICANT CHANGE UP (ref 43–77)
NEUTS BAND # BLD: 13 % — HIGH (ref 0–8)
NITRITE UR-MCNC: NEGATIVE — SIGNIFICANT CHANGE UP
NRBC # BLD: 0 /100 WBCS — SIGNIFICANT CHANGE UP (ref 0–0)
NRBC # BLD: SIGNIFICANT CHANGE UP /100 WBCS (ref 0–0)
PCO2 BLDV: 51 MMHG — SIGNIFICANT CHANGE UP (ref 42–55)
PH BLDV: 7.29 — LOW (ref 7.32–7.43)
PH UR: 6 — SIGNIFICANT CHANGE UP (ref 5–8)
PHOSPHATE SERPL-MCNC: 3 MG/DL — SIGNIFICANT CHANGE UP (ref 2.5–4.5)
PLAT MORPH BLD: NORMAL — SIGNIFICANT CHANGE UP
PLATELET # BLD AUTO: 151 K/UL — SIGNIFICANT CHANGE UP (ref 150–400)
PO2 BLDV: 84 MMHG — HIGH (ref 25–45)
POIKILOCYTOSIS BLD QL AUTO: SLIGHT — SIGNIFICANT CHANGE UP
POTASSIUM SERPL-MCNC: 3.8 MMOL/L — SIGNIFICANT CHANGE UP (ref 3.5–5.3)
POTASSIUM SERPL-SCNC: 3.8 MMOL/L — SIGNIFICANT CHANGE UP (ref 3.5–5.3)
PROCALCITONIN SERPL-MCNC: 9.85 NG/ML — HIGH (ref 0.02–0.1)
PROT SERPL-MCNC: 7.1 GM/DL — SIGNIFICANT CHANGE UP (ref 6–8.3)
PROT UR-MCNC: NEGATIVE MG/DL — SIGNIFICANT CHANGE UP
PROTHROM AB SERPL-ACNC: 11.2 SEC — SIGNIFICANT CHANGE UP (ref 9.5–13)
RBC # BLD: 3.43 M/UL — LOW (ref 4.2–5.8)
RBC # FLD: 17.1 % — HIGH (ref 10.3–14.5)
RBC BLD AUTO: SIGNIFICANT CHANGE UP
RSV RNA NPH QL NAA+NON-PROBE: SIGNIFICANT CHANGE UP
SAO2 % BLDV: 97 % — HIGH (ref 67–88)
SARS-COV-2 RNA SPEC QL NAA+PROBE: DETECTED
SODIUM SERPL-SCNC: 136 MMOL/L — SIGNIFICANT CHANGE UP (ref 135–145)
SP GR SPEC: 1.01 — SIGNIFICANT CHANGE UP (ref 1–1.03)
TROPONIN I, HIGH SENSITIVITY RESULT: 34.67 NG/L — SIGNIFICANT CHANGE UP
UROBILINOGEN FLD QL: 0.2 MG/DL — SIGNIFICANT CHANGE UP (ref 0.2–1)
WBC # BLD: 6.13 K/UL — SIGNIFICANT CHANGE UP (ref 3.8–10.5)
WBC # FLD AUTO: 6.13 K/UL — SIGNIFICANT CHANGE UP (ref 3.8–10.5)

## 2024-04-29 PROCEDURE — 99223 1ST HOSP IP/OBS HIGH 75: CPT

## 2024-04-29 PROCEDURE — 71045 X-RAY EXAM CHEST 1 VIEW: CPT | Mod: 26

## 2024-04-29 PROCEDURE — 85379 FIBRIN DEGRADATION QUANT: CPT

## 2024-04-29 PROCEDURE — 83615 LACTATE (LD) (LDH) ENZYME: CPT

## 2024-04-29 PROCEDURE — 85025 COMPLETE CBC W/AUTO DIFF WBC: CPT

## 2024-04-29 PROCEDURE — 99498 ADVNCD CARE PLAN ADDL 30 MIN: CPT

## 2024-04-29 PROCEDURE — 36415 COLL VENOUS BLD VENIPUNCTURE: CPT

## 2024-04-29 PROCEDURE — 99497 ADVNCD CARE PLAN 30 MIN: CPT | Mod: 25

## 2024-04-29 PROCEDURE — 80048 BASIC METABOLIC PNL TOTAL CA: CPT

## 2024-04-29 PROCEDURE — 99285 EMERGENCY DEPT VISIT HI MDM: CPT

## 2024-04-29 PROCEDURE — 83735 ASSAY OF MAGNESIUM: CPT

## 2024-04-29 PROCEDURE — 87086 URINE CULTURE/COLONY COUNT: CPT

## 2024-04-29 PROCEDURE — 84145 PROCALCITONIN (PCT): CPT

## 2024-04-29 PROCEDURE — 81003 URINALYSIS AUTO W/O SCOPE: CPT

## 2024-04-29 PROCEDURE — 86140 C-REACTIVE PROTEIN: CPT

## 2024-04-29 PROCEDURE — 83036 HEMOGLOBIN GLYCOSYLATED A1C: CPT

## 2024-04-29 PROCEDURE — 82728 ASSAY OF FERRITIN: CPT

## 2024-04-29 PROCEDURE — 84100 ASSAY OF PHOSPHORUS: CPT

## 2024-04-29 PROCEDURE — 82803 BLOOD GASES ANY COMBINATION: CPT

## 2024-04-29 PROCEDURE — 70450 CT HEAD/BRAIN W/O DYE: CPT | Mod: 26,MC

## 2024-04-29 PROCEDURE — 83605 ASSAY OF LACTIC ACID: CPT

## 2024-04-29 PROCEDURE — 71275 CT ANGIOGRAPHY CHEST: CPT | Mod: MC

## 2024-04-29 RX ORDER — SODIUM CHLORIDE 9 MG/ML
1000 INJECTION INTRAMUSCULAR; INTRAVENOUS; SUBCUTANEOUS
Refills: 0 | Status: DISCONTINUED | OUTPATIENT
Start: 2024-04-29 | End: 2024-05-01

## 2024-04-29 RX ORDER — OXYCODONE HYDROCHLORIDE 5 MG/1
1 TABLET ORAL
Refills: 0 | DISCHARGE

## 2024-04-29 RX ORDER — PANTOPRAZOLE SODIUM 20 MG/1
40 TABLET, DELAYED RELEASE ORAL
Refills: 0 | Status: DISCONTINUED | OUTPATIENT
Start: 2024-04-29 | End: 2024-05-01

## 2024-04-29 RX ORDER — ENOXAPARIN SODIUM 100 MG/ML
40 INJECTION SUBCUTANEOUS EVERY 24 HOURS
Refills: 0 | Status: DISCONTINUED | OUTPATIENT
Start: 2024-04-29 | End: 2024-05-01

## 2024-04-29 RX ORDER — CEFEPIME 1 G/1
1000 INJECTION, POWDER, FOR SOLUTION INTRAMUSCULAR; INTRAVENOUS ONCE
Refills: 0 | Status: COMPLETED | OUTPATIENT
Start: 2024-04-29 | End: 2024-04-29

## 2024-04-29 RX ORDER — HYDROMORPHONE HYDROCHLORIDE 2 MG/ML
0.5 INJECTION INTRAMUSCULAR; INTRAVENOUS; SUBCUTANEOUS
Refills: 0 | DISCHARGE

## 2024-04-29 RX ORDER — VALACYCLOVIR 500 MG/1
500 TABLET, FILM COATED ORAL
Refills: 0 | Status: DISCONTINUED | OUTPATIENT
Start: 2024-04-29 | End: 2024-05-01

## 2024-04-29 RX ORDER — REMDESIVIR 5 MG/ML
INJECTION INTRAVENOUS
Refills: 0 | Status: DISCONTINUED | OUTPATIENT
Start: 2024-04-29 | End: 2024-05-01

## 2024-04-29 RX ORDER — CEFEPIME 1 G/1
1000 INJECTION, POWDER, FOR SOLUTION INTRAMUSCULAR; INTRAVENOUS EVERY 8 HOURS
Refills: 0 | Status: DISCONTINUED | OUTPATIENT
Start: 2024-04-29 | End: 2024-04-29

## 2024-04-29 RX ORDER — VANCOMYCIN HCL 1 G
1000 VIAL (EA) INTRAVENOUS ONCE
Refills: 0 | Status: COMPLETED | OUTPATIENT
Start: 2024-04-29 | End: 2024-04-29

## 2024-04-29 RX ORDER — ACETAMINOPHEN 500 MG
650 TABLET ORAL EVERY 4 HOURS
Refills: 0 | Status: DISCONTINUED | OUTPATIENT
Start: 2024-04-29 | End: 2024-05-01

## 2024-04-29 RX ORDER — VALACYCLOVIR 500 MG/1
1 TABLET, FILM COATED ORAL
Refills: 0 | DISCHARGE

## 2024-04-29 RX ORDER — ATOVAQUONE 750 MG/5ML
10 SUSPENSION ORAL
Refills: 0 | DISCHARGE

## 2024-04-29 RX ORDER — CEFEPIME 1 G/1
1000 INJECTION, POWDER, FOR SOLUTION INTRAMUSCULAR; INTRAVENOUS ONCE
Refills: 0 | Status: DISCONTINUED | OUTPATIENT
Start: 2024-04-29 | End: 2024-04-29

## 2024-04-29 RX ORDER — ALBUTEROL 90 UG/1
2 AEROSOL, METERED ORAL EVERY 4 HOURS
Refills: 0 | Status: DISCONTINUED | OUTPATIENT
Start: 2024-04-29 | End: 2024-05-01

## 2024-04-29 RX ORDER — METHADONE HYDROCHLORIDE 40 MG/1
3 TABLET ORAL
Refills: 0 | DISCHARGE

## 2024-04-29 RX ORDER — BACLOFEN 100 %
5 POWDER (GRAM) MISCELLANEOUS EVERY 8 HOURS
Refills: 0 | Status: DISCONTINUED | OUTPATIENT
Start: 2024-04-29 | End: 2024-05-01

## 2024-04-29 RX ORDER — ALBUTEROL 90 UG/1
2 AEROSOL, METERED ORAL
Refills: 0 | DISCHARGE

## 2024-04-29 RX ORDER — ONDANSETRON 8 MG/1
4 TABLET, FILM COATED ORAL EVERY 6 HOURS
Refills: 0 | Status: DISCONTINUED | OUTPATIENT
Start: 2024-04-29 | End: 2024-05-01

## 2024-04-29 RX ORDER — ALPRAZOLAM 0.25 MG
1 TABLET ORAL
Refills: 0 | DISCHARGE

## 2024-04-29 RX ORDER — OMEPRAZOLE 10 MG/1
1 CAPSULE, DELAYED RELEASE ORAL
Refills: 0 | DISCHARGE

## 2024-04-29 RX ORDER — VANCOMYCIN HCL 1 G
1000 VIAL (EA) INTRAVENOUS EVERY 12 HOURS
Refills: 0 | Status: DISCONTINUED | OUTPATIENT
Start: 2024-04-29 | End: 2024-04-30

## 2024-04-29 RX ORDER — SODIUM CHLORIDE 9 MG/ML
2200 INJECTION INTRAMUSCULAR; INTRAVENOUS; SUBCUTANEOUS ONCE
Refills: 0 | Status: COMPLETED | OUTPATIENT
Start: 2024-04-29 | End: 2024-04-29

## 2024-04-29 RX ORDER — DEXTROAMPHETAMINE SACCHARATE, AMPHETAMINE ASPARTATE, DEXTROAMPHETAMINE SULFATE AND AMPHETAMINE SULFATE 1.875; 1.875; 1.875; 1.875 MG/1; MG/1; MG/1; MG/1
0.5 TABLET ORAL
Refills: 0 | DISCHARGE

## 2024-04-29 RX ORDER — DEXAMETHASONE 0.5 MG/5ML
6 ELIXIR ORAL DAILY
Refills: 0 | Status: DISCONTINUED | OUTPATIENT
Start: 2024-04-29 | End: 2024-05-01

## 2024-04-29 RX ORDER — DEXTROAMPHETAMINE SACCHARATE, AMPHETAMINE ASPARTATE, DEXTROAMPHETAMINE SULFATE AND AMPHETAMINE SULFATE 1.875; 1.875; 1.875; 1.875 MG/1; MG/1; MG/1; MG/1
1.5 TABLET ORAL
Refills: 0 | DISCHARGE

## 2024-04-29 RX ORDER — REMDESIVIR 5 MG/ML
100 INJECTION INTRAVENOUS EVERY 24 HOURS
Refills: 0 | Status: DISCONTINUED | OUTPATIENT
Start: 2024-04-30 | End: 2024-05-01

## 2024-04-29 RX ORDER — ATORVASTATIN CALCIUM 80 MG/1
1 TABLET, FILM COATED ORAL
Refills: 0 | DISCHARGE

## 2024-04-29 RX ORDER — VANCOMYCIN HCL 1 G
VIAL (EA) INTRAVENOUS
Refills: 0 | Status: DISCONTINUED | OUTPATIENT
Start: 2024-04-29 | End: 2024-04-30

## 2024-04-29 RX ORDER — OXYCODONE HYDROCHLORIDE 5 MG/1
20 TABLET ORAL EVERY 6 HOURS
Refills: 0 | Status: DISCONTINUED | OUTPATIENT
Start: 2024-04-29 | End: 2024-05-01

## 2024-04-29 RX ORDER — ACETAMINOPHEN 500 MG
1000 TABLET ORAL ONCE
Refills: 0 | Status: COMPLETED | OUTPATIENT
Start: 2024-04-29 | End: 2024-04-29

## 2024-04-29 RX ORDER — ONDANSETRON 8 MG/1
1 TABLET, FILM COATED ORAL
Refills: 0 | DISCHARGE

## 2024-04-29 RX ORDER — CEFEPIME 1 G/1
1000 INJECTION, POWDER, FOR SOLUTION INTRAMUSCULAR; INTRAVENOUS EVERY 8 HOURS
Refills: 0 | Status: DISCONTINUED | OUTPATIENT
Start: 2024-04-29 | End: 2024-05-01

## 2024-04-29 RX ORDER — REMDESIVIR 5 MG/ML
200 INJECTION INTRAVENOUS EVERY 24 HOURS
Refills: 0 | Status: COMPLETED | OUTPATIENT
Start: 2024-04-29 | End: 2024-04-29

## 2024-04-29 RX ORDER — ATORVASTATIN CALCIUM 80 MG/1
10 TABLET, FILM COATED ORAL AT BEDTIME
Refills: 0 | Status: DISCONTINUED | OUTPATIENT
Start: 2024-04-29 | End: 2024-05-01

## 2024-04-29 RX ORDER — BACLOFEN 100 %
1 POWDER (GRAM) MISCELLANEOUS
Refills: 0 | DISCHARGE

## 2024-04-29 RX ORDER — FILGRASTIM 480MCG/1.6
300 VIAL (ML) INJECTION
Refills: 0 | DISCHARGE

## 2024-04-29 RX ORDER — ATOVAQUONE 750 MG/5ML
1500 SUSPENSION ORAL DAILY
Refills: 0 | Status: DISCONTINUED | OUTPATIENT
Start: 2024-04-29 | End: 2024-05-01

## 2024-04-29 RX ORDER — METHADONE HYDROCHLORIDE 40 MG/1
30 TABLET ORAL THREE TIMES A DAY
Refills: 0 | Status: DISCONTINUED | OUTPATIENT
Start: 2024-04-29 | End: 2024-05-01

## 2024-04-29 RX ADMIN — Medication 6 MILLIGRAM(S): at 14:09

## 2024-04-29 RX ADMIN — Medication 250 MILLIGRAM(S): at 23:09

## 2024-04-29 RX ADMIN — ATORVASTATIN CALCIUM 10 MILLIGRAM(S): 80 TABLET, FILM COATED ORAL at 23:08

## 2024-04-29 RX ADMIN — Medication 250 MILLIGRAM(S): at 10:20

## 2024-04-29 RX ADMIN — Medication 150 MILLIGRAM(S): at 23:08

## 2024-04-29 RX ADMIN — CEFEPIME 1000 MILLIGRAM(S): 1 INJECTION, POWDER, FOR SOLUTION INTRAMUSCULAR; INTRAVENOUS at 23:07

## 2024-04-29 RX ADMIN — Medication 1000 MILLIGRAM(S): at 10:09

## 2024-04-29 RX ADMIN — SODIUM CHLORIDE 2200 MILLILITER(S): 9 INJECTION INTRAMUSCULAR; INTRAVENOUS; SUBCUTANEOUS at 04:45

## 2024-04-29 RX ADMIN — VALACYCLOVIR 500 MILLIGRAM(S): 500 TABLET, FILM COATED ORAL at 23:08

## 2024-04-29 RX ADMIN — Medication 400 MILLIGRAM(S): at 05:21

## 2024-04-29 RX ADMIN — ENOXAPARIN SODIUM 40 MILLIGRAM(S): 100 INJECTION SUBCUTANEOUS at 23:09

## 2024-04-29 RX ADMIN — REMDESIVIR 200 MILLIGRAM(S): 5 INJECTION INTRAVENOUS at 15:24

## 2024-04-29 RX ADMIN — Medication 150 MILLIGRAM(S): at 14:09

## 2024-04-29 RX ADMIN — CEFEPIME 1000 MILLIGRAM(S): 1 INJECTION, POWDER, FOR SOLUTION INTRAMUSCULAR; INTRAVENOUS at 05:21

## 2024-04-29 RX ADMIN — CEFEPIME 1000 MILLIGRAM(S): 1 INJECTION, POWDER, FOR SOLUTION INTRAMUSCULAR; INTRAVENOUS at 14:09

## 2024-04-29 NOTE — ED PROVIDER NOTE - CARE PLAN
Principal Discharge DX:	Pneumonia  Secondary Diagnosis:	AMS (altered mental status)  Secondary Diagnosis:	Fever   1

## 2024-04-29 NOTE — ED ADULT TRIAGE NOTE - CHIEF COMPLAINT QUOTE
pt ambulatory to triage c/o possible reaction to cancer treatment. pt has multiple myeloma, received immunotherapy treatment on tuesday and has developed fever (tmax 102.1), fatigue,  cough, and confusion. 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 no meds PTA. -n/v/d. allergies to allopurinol.

## 2024-04-29 NOTE — ED PROVIDER NOTE - PROGRESS NOTE DETAILS
Edward SMITH: Patient with fever, AMS, dehydrated, diaphoretic, recent treatmetn for MM, hx of CRE, labs, cat scan imaging and RVP shows chronic anemia, no neutropenia but COVID+, and xray shows RLL pna. Given hx of MM, immunosuppression, AMS, high risk for bacteremia, pneumonia with sepsis, s/p cefepime, fluid hydration. Spoke with Dr. Rona Turner, hospitalist admission of patient is appreciated.

## 2024-04-29 NOTE — ED ADULT NURSE NOTE - CAS EDP DISCH DISPOSITION ADMI
"SUBJECTIVE:  Sophy Iyer is a 64 y.o. female here for Cough (Productive cough x 1 week) and Sore Throat      HPI  Presents to the clinic with c/o cough, sore throat PND and occasional wheezing.  Symptoms for over a week.  Denies any SOB or fever.    Acostas allergies, medications, history, and problem list were updated as appropriate.  Cough is mildly productive at times.    Review of Systems   HENT:  Positive for congestion (mild) and sore throat.    Respiratory:  Positive for cough and wheezing (on and off).       A comprehensive review of symptoms was completed and negative except as noted above.    No results found for this or any previous visit (from the past 504 hour(s)).    OBJECTIVE:  Vital signs  Vitals:    08/14/23 0929   BP: 130/74   BP Location: Right arm   Patient Position: Sitting   BP Method: Large (Manual)   Pulse: 63   Temp: 97.4 °F (36.3 °C)   TempSrc: Temporal   SpO2: 96%   Weight: 98.7 kg (217 lb 9.6 oz)   Height: 4' 11.02" (1.499 m)        Physical Exam  Constitutional:       Appearance: She is obese.   HENT:      Head: Normocephalic and atraumatic.      Right Ear: Tympanic membrane, ear canal and external ear normal.      Left Ear: Tympanic membrane, ear canal and external ear normal.      Nose: Congestion (mild) present.      Mouth/Throat:      Mouth: Mucous membranes are moist.      Pharynx: Oropharynx is clear. Posterior oropharyngeal erythema (mild with PND) present.   Eyes:      Conjunctiva/sclera: Conjunctivae normal.   Cardiovascular:      Rate and Rhythm: Normal rate and regular rhythm.   Pulmonary:      Effort: Pulmonary effort is normal.      Breath sounds: Normal breath sounds.   Abdominal:      General: Bowel sounds are normal.      Palpations: Abdomen is soft.   Musculoskeletal:      Cervical back: Neck supple.   Skin:     General: Skin is warm.      Capillary Refill: Capillary refill takes less than 2 seconds.   Neurological:      Mental Status: She is alert.      Gait: Gait " abnormal (walks with cane).   Psychiatric:         Mood and Affect: Mood normal.         Behavior: Behavior normal.         Judgment: Judgment normal.          ASSESSMENT/PLAN:  1. Maxillary sinusitis, unspecified chronicity  -     dexAMETHasone injection 10 mg  -     sulfamethoxazole-trimethoprim 800-160mg (BACTRIM DS) 800-160 mg Tab; Take 1 tablet by mouth 2 (two) times daily. for 10 days  Dispense: 20 tablet; Refill: 0    2. Wheezing  -     dexAMETHasone injection 10 mg    Other orders  -     dextromethorphan (DELSYM 12 HOUR) 30 mg/5 mL liquid; Take 10 mLs (60 mg total) by mouth 2 (two) times daily. for 10 days  Dispense: 148 mL; Refill: 1    Increase fluids.  OTC cold medication of choice.  Delsym Bid prn.    Follow Up:  Follow up if symptoms worsen or fail to improve.             [facility]

## 2024-04-29 NOTE — PATIENT PROFILE ADULT - FALL HARM RISK - HARM RISK INTERVENTIONS

## 2024-04-29 NOTE — H&P ADULT - NSHPLABSRESULTS_GEN_ALL_CORE
LABS: All Labs Reviewed:                        9.9    6.13  )-----------( 151      ( 29 Apr 2024 04:49 )             31.9     04-29    136  |  105  |  21  ----------------------------<  133<H>  3.8   |  26  |  0.92    Ca    8.9      29 Apr 2024 04:49    TPro  7.1  /  Alb  3.4  /  TBili  0.3  /  DBili  x   /  AST  36  /  ALT  28  /  AlkPhos  71  04-29    PT/INR - ( 29 Apr 2024 04:49 )   PT: 11.2 sec;   INR: 0.99 ratio         PTT - ( 29 Apr 2024 04:49 )  PTT:20.9 sec          Blood Culture:             < from: CT Head No Cont (04.29.24 @ 05:10) >      IMPRESSION:    No acute intracranial hemorrhage, mass effect, or CT evidence of an acute   vascular territorial infarct.    < end of copied text >

## 2024-04-29 NOTE — ED ADULT NURSE NOTE - OBJECTIVE STATEMENT
Pt arrives to ED c/o altered mental status and fever. Pt wife states pt has hx of multiple myleoma and has been receiving clinical trial immunotherapy. Pt had last dose Tuesday. This afternoon pt became disoriented at home, per wife, 'not making sense' and felt warm tmax 102. Pt had similar reaction to immunotherapy after first dose in January. Pt  is short of breath on arrival and febrile. Pt is alert and oriented x3 but visibly lethargic and occasionally forgetful. No slurred speech, denies numbness/tingling. Pt denies any chest pain, abd, N/V, headache, changes in vision, or any urinary symptoms.

## 2024-04-29 NOTE — ED PROVIDER NOTE - OBJECTIVE STATEMENT
73 year old male with PMH of MM, anemia of chronic disease, on active treatment, hx of kyphoplasty, patient of Dr. Carney, oncologist who presents bib spouse with cc of AMS, diaphoresis, and fever. No abdominal pain. Cough, and congestion noted. No neck pain or stiffness. No recent trauma. As per spouse patient has had a hx of CRS (Cytokine release syndrome) in the past that presented the same way. No recent trauma. No visual or focal neurological complaints.

## 2024-04-29 NOTE — H&P ADULT - ASSESSMENT
73 year old male with PMH of MM, anemia of chronic disease, on active treatment, hx of kyphoplasty, patient of Dr. Carney, oncologist who presents bib spouse with cc of AMS, diaphoresis, and fever. No abdominal pain. Cough, and congestion noted. No neck pain or stiffness. No recent trauma. As per spouse patient has had a hx of CRS (Cytokine release syndrome) in the past that presented the same way. No recent trauma. No visual or focal neurological complaints.    Pt not able to provide any collateral due to lethargy. Wife at bedside, states ssx began 1 day ago with associated confusion, cough, wheezing and fever. Last immunotherapy was given last week. Wife states they both had covid approx 2 months ago and he had recovered - no paxlovid tx at that time.   Pt hypoxic on arrival with findings on cxr c/w RLL pna  CTH: negative      #Acute hypoxemic resp failure 2/2 to covid with superimposed bacterial pna  #Metabolic encephalopathy  #MM on immunotherapy - immunocompromised patient  #Chronic pain on chronic pain meds  - admit to MS on pulse ox q8h  - check VBG  - CTH: negative  - supplemental o2  - start remdesivir and decadron  - vanco/cefepime  - ID and oncology consults  - albuterol  - anti-tussives  - dimer, ferritin, procal  - monitor resp status - pt on heavy narcotic regimen and made wife aware that we will implement judicious use of narcotics due to his already fragil resp status  - presently in stable condition and no indication for higher level of care at present time      Full code  DVT px  Wife Yenny Wade / hcp [phone] 73 year old male with PMH of MM, anemia of chronic disease, on active treatment, hx of kyphoplasty, patient of Dr. Carney, oncologist who presents bib spouse with cc of AMS, diaphoresis, and fever. No abdominal pain. Cough, and congestion noted. No neck pain or stiffness. No recent trauma. As per spouse patient has had a hx of CRS (Cytokine release syndrome) in the past that presented the same way. No recent trauma. No visual or focal neurological complaints.    Pt not able to provide any collateral due to lethargy. Wife at bedside, states ssx began 1 day ago with associated confusion, cough, wheezing and fever. Last immunotherapy was given last week. Wife states they both had covid approx 2 months ago and he had recovered - no paxlovid tx at that time.   Pt hypoxic on arrival with findings on cxr c/w RLL pna  CTH: negative      #Acute hypoxemic resp failure 2/2 to covid with superimposed bacterial pna/Sepsis with bandemia poa  #Metabolic encephalopathy  #MM on immunotherapy - immunocompromised patient  #Chronic pain on chronic pain meds  - admit to MS on pulse ox q8h  - check VBG  - CTH: negative  - supplemental o2  - start remdesivir and decadron  - vanco/cefepime for suspected gram negative kisha pna  - ID and oncology consults  - albuterol  - anti-tussives  - dimer, ferritin, procal, crp  - monitor resp status - pt on heavy narcotic regimen and made wife aware that we will implement judicious use of narcotics due to his already fragil resp status  - presently in stable condition and no indication for higher level of care at present time      Full code  DVT px  Wife Yenny Wade / hcp [phone] 73 year old male with PMH of MM, anemia of chronic disease, on active treatment, hx of kyphoplasty, patient of Dr. Carney, oncologist who presents bib spouse with cc of AMS, diaphoresis, and fever. No abdominal pain. Cough, and congestion noted. No neck pain or stiffness. No recent trauma. As per spouse patient has had a hx of CRS (Cytokine release syndrome) in the past that presented the same way. No recent trauma. No visual or focal neurological complaints.    Pt not able to provide any collateral due to lethargy. Wife at bedside, states ssx began 1 day ago with associated confusion, cough, wheezing and fever. Last immunotherapy was given last week. Wife states they both had covid approx 2 months ago and he had recovered - no paxlovid tx at that time.   Pt hypoxic on arrival with findings on cxr c/w RLL pna  CTH: negative      #Acute hypoxemic resp failure 2/2 to covid with superimposed bacterial pna/Sepsis with bandemia poa  #Metabolic encephalopathy  #MM on immunotherapy - immunocompromised patient  #Chronic pain on chronic pain meds  - admit to MS on pulse ox q8h  - check VBG  - CTH: negative  - supplemental o2  - start remdesivir and decadron  - vanco/cefepime for suspected gram negative kisha pna  - rec'd last tx of bispecific antibody immunotx last week -> monitor for CRS  - send off inflammatory markers  - ID  - oncology consults: eval for CRS and need for Toci - however has reasons for hypoxia, fever and ams (pna/covid)  - albuterol  - anti-tussives  - dimer, ferritin, procal, crp  - monitor resp status - pt on heavy narcotic regimen and made wife aware that we will implement judicious use of narcotics due to his already fragil resp status  - presently in stable condition and no indication for higher level of care at present time      Full code  DVT px  Wife Yenny Wade / hcp [phone]

## 2024-04-29 NOTE — H&P ADULT - NSHPPHYSICALEXAM_GEN_ALL_CORE
ICU Vital Signs Last 24 Hrs  T(C): 38.4 (29 Apr 2024 04:10), Max: 38.4 (29 Apr 2024 04:10)  T(F): 101.2 (29 Apr 2024 04:10), Max: 101.2 (29 Apr 2024 04:10)  HR: 89 (29 Apr 2024 06:30) (89 - 118)  BP: 111/82 (29 Apr 2024 06:30) (111/69 - 132/73)  BP(mean): 91 (29 Apr 2024 06:30) (83 - 96)  ABP: --  ABP(mean): --  RR: 19 (29 Apr 2024 06:30) (16 - 22)  SpO2: 96% (29 Apr 2024 06:30) (94% - 96%)    O2 Parameters below as of 29 Apr 2024 06:30  Patient On (Oxygen Delivery Method): nasal cannula  O2 Flow (L/min): 2    PHYSICAL EXAM:    Constitutional: NAD, lethargic but arousable to verbal stim,   HEENT: PERR, EOMI, Normal Hearing, MMM  Neck: Soft and supple, No LAD, No JVD  Respiratory: scattered rhonchi  Cardiovascular: S1 and S2, regular rate and rhythm, no Murmurs, gallops or rubs  Gastrointestinal: Bowel Sounds present, soft, nontender, nondistended, no guarding, no rebound  Extremities: No peripheral edema  Vascular: 2+ peripheral pulses  Neurological: A/O x 1, no focal deficits  Musculoskeletal: 5/5 strength b/l upper and lower extremities  Skin: No rashes

## 2024-04-29 NOTE — ED PROVIDER NOTE - CLINICAL SUMMARY MEDICAL DECISION MAKING FREE TEXT BOX
AMS, fever, immune suppressed, clinically dehydrated, initially tachycardic, intermittent tachypnea, ausculation and xray clinically concerning for RLL PNA. Hx of previous CRS, labs, xr, RVP and CT. RVP positive for COVID-19. X-ray to my eyes concerning for RLL PNA. s/p abx, fluid hydration. Spoke with wife and patient. Clinically improved in the ER after fluid hydration. Patient's tachycardia resolved. No stridor. No wheezing.

## 2024-04-29 NOTE — H&P ADULT - HISTORY OF PRESENT ILLNESS
73 year old male with PMH of MM, anemia of chronic disease, on active treatment, hx of kyphoplasty, patient of Dr. Carney, oncologist who presents bib spouse with cc of AMS, diaphoresis, and fever. No abdominal pain. Cough, and congestion noted. No neck pain or stiffness. No recent trauma. As per spouse patient has had a hx of CRS (Cytokine release syndrome) in the past that presented the same way. No recent trauma. No visual or focal neurological complaints.    Pt not able to provide any collateral due to lethargy. Wife at bedside, states ssx began 1 day ago with associated confusion, cough, wheezing and fever. Last immunotherapy was given last week. Wife states they both had covid approx 2 months ago and he had recovered - no paxlovid tx at that time.   Pt hypoxic on arrival with findings on cxr c/w RLL pna  CTH: negative        PAST MEDICAL/SURGICAL/FAMILY/SOCIAL HISTORY:    Past Medical, Past Surgical, and Family History:  PAST MEDICAL HISTORY:  Anemia     Back pain     Multiple myeloma.     PAST SURGICAL HISTORY:  H/O carpal tunnel repair 10 years ago    S/P appendectomy     S/P kyphoplasty X2    S/P tonsillectomy.     FAMILY HISTORY:  Mother  Still living? No  Family history of cervical cancer, Age at diagnosis: Age Unknown.    ALLERGIES AND HOME MEDICATIONS:   Allergies:        Allergies:  	allopurinol: Drug, Rash  	Bactrim: Drug, Rash    Home Medications:   * Patient Currently Takes Medications as of 24-Jan-2019 15:31 documented in Structured Notes  · 	Flonase 50 mcg/inh nasal spray: 1 spray(s) intranasally once a day   · 	pseudoephedrine 60 mg

## 2024-04-29 NOTE — PATIENT PROFILE ADULT - PATIENT REPRESENTATIVE: ( YOU CAN CHOOSE ANY PERSON THAT CAN ASSIST YOU WITH YOUR HEALTH CARE PREFERENCES, DOES NOT HAVE TO BE A SPOUSE, IMMEDIATE FAMILY OR SIGNIFICANT OTHER/PARTNER)
Pt instructions:  Rest, ice, elevation  Use crutches with no weight bearing for next 1-2 days  Follow up with Primary Care Doctor for further management and to have your blood pressure rechecked.   Return to ER with pain, swelling, numbness/tingling, fever, chills, weakness, nausea, vomiting, diarrhea, abdominal pain, back pain, urinary concerns, chest pain, shortness of breath, dizziness, headache, worsening of symptoms or other concerns.     
declines

## 2024-04-29 NOTE — H&P ADULT - NSHPOUTPATIENTPROVIDERS_GEN_ALL_CORE
[facility] oncology: DR Nicole Carney [phone] - called at 0830 on 4/29, officed closed, will re attempt after 9 am

## 2024-04-29 NOTE — ED PROVIDER NOTE - DIFFERENTIAL DIAGNOSIS
AMS, fever, immune suppressed, clinically dehydrated, initially tachycardic, intermittent tachypnea, ausculation and xray clinically concerning for RLL PNA. Hx of previous CRS, labs, xr, RVP and CT. RVP positive for COVID-19. X-ray to my eyes concerning for RLL PNA. s/p abx, fluid hydration. Spoke with wife and patient. Clinically improved in the ER after fluid hydration. Patient's tachycardia resolved. No stridor. No wheezing. Differential Diagnosis

## 2024-04-29 NOTE — ED ADULT NURSE REASSESSMENT NOTE - NS ED NURSE REASSESS COMMENT FT1
Pt report received from Blanche QUEEN. Pt contact made, resting in stretcher comfortably locked in lowest position. Pt is Aox4 and speaking in full sentences, Menu offered to wife at bedside, no other verbal complaints at this time.

## 2024-04-29 NOTE — ED ADULT NURSE NOTE - CHPI ED NUR SYMPTOMS NEG
no blurred vision/no confusion/no dizziness/no loss of consciousness/no nausea/no numbness/no vomiting

## 2024-04-29 NOTE — H&P ADULT - CONVERSATION DETAILS
All risks and benefits regarding CPR and mechanical ventilation were thoroughly discussed with patient/family. Discussion included but were not limited to the likelihood of successful ROSC in the event patient was found unresponsive and without a pulse and/or in respiratory arrest. Patient and/or family were allowed to ask pertinent questions regarding the MOLST form.  Patient and/or family were able to relay pertinent information regarding the MOLST form in layman's terms to this writer.    Time spent discussing Goals of care and MOLST form: > 31 minutes    Full code

## 2024-04-29 NOTE — ED ADULT NURSE NOTE - NSFALLRISKINTERV_ED_ALL_ED
Assistance OOB with selected safe patient handling equipment if applicable/Assistance with ambulation/Communicate fall risk and risk factors to all staff, patient, and family/Monitor gait and stability/Monitor for mental status changes and reorient to person, place, and time, as needed/Provide patient with walking aids/Provide visual cue: yellow wristband, yellow gown, etc/Reinforce activity limits and safety measures with patient and family/Toileting schedule using arm’s reach rule for commode and bathroom/Use of alarms - bed, stretcher, chair and/or video monitoring/Call bell, personal items and telephone in reach/Instruct patient to call for assistance before getting out of bed/chair/stretcher/Non-slip footwear applied when patient is off stretcher/[place] to call system/Physically safe environment - no spills, clutter or unnecessary equipment/Purposeful Proactive Rounding/Room/bathroom lighting operational, light cord in reach

## 2024-04-29 NOTE — ED ADULT NURSE NOTE - CHIEF COMPLAINT QUOTE
October 26, 2020     Patient: Shane Fernandez   YOB: 1992   Date of Visit: 10/26/2020       To Whom it May Concern:    Shane Fernandez was seen in my clinic on 10/26/2020 at 8:40 am.    Please excuse Shane for his absence from work on the date listed above to be able to make his appointment. Shane may return to work and resume normal duties on 10/27/2020.    Sincerely,         Kirsten Forrest CNP    Medical information is confidential and cannot be disclosed without the written consent of the patient or his representative.       pt ambulatory to triage c/o possible reaction to cancer treatment. pt has multiple myeloma, received immunotherapy treatment on tuesday and has developed fever (tmax 102.1), fatigue,  cough, and confusion. 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 no meds PTA. -n/v/d. allergies to allopurinol.

## 2024-04-30 LAB
A1C WITH ESTIMATED AVERAGE GLUCOSE RESULT: 5.9 % — HIGH (ref 4–5.6)
ANION GAP SERPL CALC-SCNC: 2 MMOL/L — LOW (ref 5–17)
ANISOCYTOSIS BLD QL: SLIGHT — SIGNIFICANT CHANGE UP
BASOPHILS # BLD AUTO: 0 K/UL — SIGNIFICANT CHANGE UP (ref 0–0.2)
BASOPHILS NFR BLD AUTO: 0 % — SIGNIFICANT CHANGE UP (ref 0–2)
BUN SERPL-MCNC: 20 MG/DL — SIGNIFICANT CHANGE UP (ref 7–23)
CALCIUM SERPL-MCNC: 9.3 MG/DL — SIGNIFICANT CHANGE UP (ref 8.5–10.1)
CHLORIDE SERPL-SCNC: 106 MMOL/L — SIGNIFICANT CHANGE UP (ref 96–108)
CO2 SERPL-SCNC: 27 MMOL/L — SIGNIFICANT CHANGE UP (ref 22–31)
CREAT SERPL-MCNC: 0.87 MG/DL — SIGNIFICANT CHANGE UP (ref 0.5–1.3)
CULTURE RESULTS: NO GROWTH — SIGNIFICANT CHANGE UP
EGFR: 91 ML/MIN/1.73M2 — SIGNIFICANT CHANGE UP
EOSINOPHIL # BLD AUTO: 0 K/UL — SIGNIFICANT CHANGE UP (ref 0–0.5)
EOSINOPHIL NFR BLD AUTO: 0 % — SIGNIFICANT CHANGE UP (ref 0–6)
ESTIMATED AVERAGE GLUCOSE: 123 MG/DL — HIGH (ref 68–114)
GLUCOSE SERPL-MCNC: 133 MG/DL — HIGH (ref 70–99)
HCT VFR BLD CALC: 30.2 % — LOW (ref 39–50)
HGB BLD-MCNC: 8.9 G/DL — LOW (ref 13–17)
LYMPHOCYTES # BLD AUTO: 0.53 K/UL — LOW (ref 1–3.3)
LYMPHOCYTES # BLD AUTO: 3 % — LOW (ref 13–44)
MACROCYTES BLD QL: SLIGHT — SIGNIFICANT CHANGE UP
MANUAL SMEAR VERIFICATION: SIGNIFICANT CHANGE UP
MCHC RBC-ENTMCNC: 28.3 PG — SIGNIFICANT CHANGE UP (ref 27–34)
MCHC RBC-ENTMCNC: 29.5 GM/DL — LOW (ref 32–36)
MCV RBC AUTO: 96.2 FL — SIGNIFICANT CHANGE UP (ref 80–100)
MICROCYTES BLD QL: SLIGHT — SIGNIFICANT CHANGE UP
MONOCYTES # BLD AUTO: 0.53 K/UL — SIGNIFICANT CHANGE UP (ref 0–0.9)
MONOCYTES NFR BLD AUTO: 3 % — SIGNIFICANT CHANGE UP (ref 2–14)
NEUTROPHILS # BLD AUTO: 16.57 K/UL — HIGH (ref 1.8–7.4)
NEUTROPHILS NFR BLD AUTO: 93 % — HIGH (ref 43–77)
NEUTS BAND # BLD: 1 % — SIGNIFICANT CHANGE UP (ref 0–8)
NRBC # BLD: 0 /100 WBCS — SIGNIFICANT CHANGE UP (ref 0–0)
NRBC # BLD: SIGNIFICANT CHANGE UP /100 WBCS (ref 0–0)
OVALOCYTES BLD QL SMEAR: SLIGHT — SIGNIFICANT CHANGE UP
PLAT MORPH BLD: NORMAL — SIGNIFICANT CHANGE UP
PLATELET # BLD AUTO: 129 K/UL — LOW (ref 150–400)
POIKILOCYTOSIS BLD QL AUTO: SLIGHT — SIGNIFICANT CHANGE UP
POTASSIUM SERPL-MCNC: 4.5 MMOL/L — SIGNIFICANT CHANGE UP (ref 3.5–5.3)
POTASSIUM SERPL-SCNC: 4.5 MMOL/L — SIGNIFICANT CHANGE UP (ref 3.5–5.3)
RBC # BLD: 3.14 M/UL — LOW (ref 4.2–5.8)
RBC # FLD: 17.2 % — HIGH (ref 10.3–14.5)
RBC BLD AUTO: ABNORMAL
SODIUM SERPL-SCNC: 135 MMOL/L — SIGNIFICANT CHANGE UP (ref 135–145)
SPECIMEN SOURCE: SIGNIFICANT CHANGE UP
WBC # BLD: 17.63 K/UL — HIGH (ref 3.8–10.5)
WBC # FLD AUTO: 17.63 K/UL — HIGH (ref 3.8–10.5)

## 2024-04-30 PROCEDURE — 71275 CT ANGIOGRAPHY CHEST: CPT | Mod: 26

## 2024-04-30 PROCEDURE — 99232 SBSQ HOSP IP/OBS MODERATE 35: CPT

## 2024-04-30 RX ADMIN — CEFEPIME 1000 MILLIGRAM(S): 1 INJECTION, POWDER, FOR SOLUTION INTRAMUSCULAR; INTRAVENOUS at 06:26

## 2024-04-30 RX ADMIN — PANTOPRAZOLE SODIUM 40 MILLIGRAM(S): 20 TABLET, DELAYED RELEASE ORAL at 10:22

## 2024-04-30 RX ADMIN — REMDESIVIR 200 MILLIGRAM(S): 5 INJECTION INTRAVENOUS at 13:46

## 2024-04-30 RX ADMIN — ATOVAQUONE 1500 MILLIGRAM(S): 750 SUSPENSION ORAL at 10:22

## 2024-04-30 RX ADMIN — VALACYCLOVIR 500 MILLIGRAM(S): 500 TABLET, FILM COATED ORAL at 10:22

## 2024-04-30 RX ADMIN — CEFEPIME 1000 MILLIGRAM(S): 1 INJECTION, POWDER, FOR SOLUTION INTRAMUSCULAR; INTRAVENOUS at 21:06

## 2024-04-30 RX ADMIN — Medication 150 MILLIGRAM(S): at 21:06

## 2024-04-30 RX ADMIN — Medication 6 MILLIGRAM(S): at 10:22

## 2024-04-30 RX ADMIN — VALACYCLOVIR 500 MILLIGRAM(S): 500 TABLET, FILM COATED ORAL at 21:06

## 2024-04-30 RX ADMIN — Medication 150 MILLIGRAM(S): at 13:44

## 2024-04-30 RX ADMIN — ENOXAPARIN SODIUM 40 MILLIGRAM(S): 100 INJECTION SUBCUTANEOUS at 22:25

## 2024-04-30 RX ADMIN — Medication 250 MILLIGRAM(S): at 10:23

## 2024-04-30 RX ADMIN — ATORVASTATIN CALCIUM 10 MILLIGRAM(S): 80 TABLET, FILM COATED ORAL at 21:05

## 2024-04-30 RX ADMIN — CEFEPIME 1000 MILLIGRAM(S): 1 INJECTION, POWDER, FOR SOLUTION INTRAMUSCULAR; INTRAVENOUS at 13:44

## 2024-04-30 RX ADMIN — Medication 150 MILLIGRAM(S): at 06:26

## 2024-04-30 NOTE — CONSULT NOTE ADULT - ASSESSMENT
73 year old male with PMH of MM, anemia of chronic disease, on active treatment, hx of kyphoplasty, patient of Dr. Carney, oncologist who presents bib spouse with cc of AMS, diaphoresis, and fever.     # multiple myeloma   - follows with DR. Carney at [facility]  - pt s/p CAR-T therapy and on clinical trial with Bispecific ABBV 2-283 s/p C2 of therapy   - currently on IVIG q monthly- LD 4/12/24- send immunoglobulin panel   - discussed with Dr. King who also follows pt     # sepsis 2/2 PNA, covid   - pt seen by ID   - CXR c/w PNA  - wbc 17.6, Hb 8.9, ptl 129, CRP 21, ferritin 1125 with elevated procal, UA neg, COVID positive   - cTA chest- No pulmonary embolus. [place] in both lungs, nonspecific, however consider viral infection. Heterogeneous bone marrow compatible with multiple myeloma.  - pt with h/o CRS  - unlikely to be related to CRS this admission- pt sitting up comfortably in chair this am, afebrile, vss   - CRS typically begins within 1 to 14 days (median, 2 to 3 days) after CART, and may occur within minutes to hours after infusion of conventional therapeutic or bispecific antibodies  - pt denies any headache, rash, arthralgias or hypotension   - would continue to treat supportively - c/w cefepime, remdesivir, valacyclovir and atovaquone     will continue to follow 
73 year old male with PMH of MM, anemia of chronic disease, on active treatment, hx of kyphoplasty, patient of Dr. Carney, oncologist who presents bib spouse with cc of AMS, diaphoresis, and fever. No abdominal pain. Cough, and congestion noted. No neck pain or stiffness. No recent trauma. As per spouse patient has had a hx of CRS (Cytokine release syndrome) in the past that presented the same way. No recent trauma. No visual or focal neurological complaints. Pt not able to provide any collateral due to lethargy. Wife at bedside, states ssx began 1 day ago with associated confusion, cough, wheezing and fever. Last immunotherapy was given last week. Wife states they both had covid approx 2 months ago and he had recovered - no paxlovid tx at that time. Pt hypoxic on arrival with findings on cxr c/w RLL pna.  CTH: negative Started on steroids, abx.     1. Acute respiratory failure. Covid-19 Viral syndrome. Multifocal pneumonia. Multiple myeloma. Immunocompromised host  - imaging reviewed  - agree with remdesivir, decadron #2  - on cefepime 1gmq8h for superimposed bacterial coverage  - fu cultures  - monitor temps  - tolerating abx well so far; no side effects noted  - reason for abx use and side effects reviewed with patient  - supportive care  - isolation precautions  - oncology f.u noted      Clinical team may change from intravenous to oral antibiotics when the following criteria are met:   1. Patient is clinically improving/stable       a)	Improved signs and symptoms of infection from initial presentation       b)	Afebrile for 24 hours       c)	Leukocytosis trending towards normal range   2. Patient is tolerating oral intake   3. Initial/repeat blood cultures are negative OR do not need to wait for preliminary blood cultures to result    When above criteria met  may change iv antibiotics to: po ceftin 500mg bid x 7 day course

## 2024-04-30 NOTE — CONSULT NOTE ADULT - SUBJECTIVE AND OBJECTIVE BOX
HPI:    73 year old male with PMH of MM, anemia of chronic disease, on active treatment, hx of kyphoplasty, patient of Dr. Carney, oncologist who presents bib spouse with cc of AMS, diaphoresis, and fever.     No abdominal pain. Cough, and congestion noted. No neck pain or stiffness. No recent trauma. As per spouse patient has had a hx of CRS (Cytokine release syndrome) in the past that presented the same way. No recent trauma. No visual or focal neurological complaints.    Pt not able to provide any collateral due to lethargy. Wife at bedside, states ssx began 1 day ago with associated confusion, cough, wheezing and fever. Last immunotherapy was given last week. Wife states they both had covid approx 2 months ago and he had recovered - no paxlovid tx at that time.   Pt hypoxic on arrival with findings on cxr c/w RLL pna  CTH: negative        PAST MEDICAL/SURGICAL/FAMILY/SOCIAL HISTORY:    Past Medical, Past Surgical, and Family History:  PAST MEDICAL HISTORY:  Anemia     Back pain     Multiple myeloma.     PAST SURGICAL HISTORY:  H/O carpal tunnel repair 10 years ago    S/P appendectomy     S/P kyphoplasty X2    S/P tonsillectomy.     FAMILY HISTORY:  Mother  Still living? No  Family history of cervical cancer, Age at diagnosis: Age Unknown.    ALLERGIES AND HOME MEDICATIONS:   Allergies:        Allergies:  	allopurinol: Drug, Rash  	Bactrim: Drug, Rash    Home Medications:   * Patient Currently Takes Medications as of 24-Jan-2019 15:31 documented in Structured Notes  · 	Flonase 50 mcg/inh nasal spray: 1 spray(s) intranasally once a day   · 	pseudoephedrine 60 mg (29 Apr 2024 08:44)      PAST MEDICAL & SURGICAL HISTORY:  Multiple myeloma      Anemia      Back pain      S/P kyphoplasty  X2      S/P appendectomy      S/P tonsillectomy      H/O carpal tunnel repair  10 years ago          Allergies    Bactrim (Rash)  allopurinol (Rash)    Intolerances        MEDICATIONS  (STANDING):  atorvastatin 10 milliGRAM(s) Oral at bedtime  atovaquone  Suspension 1500 milliGRAM(s) Oral daily  cefepime  Injectable. 1000 milliGRAM(s) IV Push every 8 hours  dexAMETHasone  Injectable 6 milliGRAM(s) IV Push daily  enoxaparin Injectable 40 milliGRAM(s) SubCutaneous every 24 hours  pantoprazole    Tablet 40 milliGRAM(s) Oral before breakfast  pregabalin 150 milliGRAM(s) Oral every 8 hours  remdesivir  IVPB   IV Intermittent   remdesivir  IVPB 100 milliGRAM(s) IV Intermittent every 24 hours  sodium chloride 0.9%. 1000 milliLiter(s) (50 mL/Hr) IV Continuous <Continuous>  valACYclovir 500 milliGRAM(s) Oral two times a day    MEDICATIONS  (PRN):  acetaminophen     Tablet .. 650 milliGRAM(s) Oral every 4 hours PRN Temp greater or equal to 38.5C (101.3F)  albuterol    90 MICROgram(s) HFA Inhaler 2 Puff(s) Inhalation every 4 hours PRN Shortness of Breath and/or Wheezing  baclofen 5 milliGRAM(s) Oral every 8 hours PRN for muscle spasm  benzonatate 100 milliGRAM(s) Oral three times a day PRN Cough  methadone    Tablet 30 milliGRAM(s) Oral three times a day PRN for severe pain  ondansetron Injectable 4 milliGRAM(s) IV Push every 6 hours PRN Nausea and/or Vomiting  oxyCODONE    IR 20 milliGRAM(s) Oral every 6 hours PRN for severe breakthrough pain      FAMILY HISTORY:  Family history of cervical cancer (Mother)        SOCIAL HISTORY: No EtOH, no tobacco    REVIEW OF SYSTEMS:    CONSTITUTIONAL: +fevers no chills  EYES/ENT: No visual changes;  No vertigo or throat pain   NECK: No pain or stiffness  RESPIRATORY: No cough, wheezing, hemoptysis; +shortness of breath  CARDIOVASCULAR: No chest pain or palpitations  GASTROINTESTINAL: No abdominal or epigastric pain. No nausea, vomiting, or hematemesis; No diarrhea or constipation. No melena or hematochezia.  GENITOURINARY: No dysuria, frequency or hematuria  NEUROLOGICAL: No numbness or weakness  SKIN: No itching, burning, rashes, or lesions   All other review of systems is negative unless indicated above.        T(F): 97.9 (04-30-24 @ 07:35), Max: 98.1 (04-29-24 @ 18:33)  HR: 68 (04-30-24 @ 07:35)  BP: 119/59 (04-30-24 @ 07:35)  RR: 18 (04-30-24 @ 07:35)  SpO2: 93% (04-30-24 @ 07:35)  Wt(kg): --    GENERAL: NAD, well-developed  EYES: EOMI  CHEST/LUNG: Clear to auscultation bilaterally;   HEART: Regular rate and rhythm;   ABDOMEN: Soft, Nontender  EXTREMITIES:  mild edema  NEUROLOGY: non-focal                          8.9    17.63 )-----------( 129      ( 30 Apr 2024 06:41 )             30.2       04-30    135  |  106  |  20  ----------------------------<  133<H>  4.5   |  27  |  0.87    Ca    9.3      30 Apr 2024 06:41  Phos  3.0     04-29  Mg     1.8     04-29    TPro  7.1  /  Alb  3.4  /  TBili  0.3  /  DBili  x   /  AST  36  /  ALT  28  /  AlkPhos  71  04-29          PT/INR - ( 29 Apr 2024 04:49 )   PT: 11.2 sec;   INR: 0.99 ratio         PTT - ( 29 Apr 2024 04:49 )  PTT:20.9 sec    Clean Catch None  04-29 @ 11:17   No growth  --  --      .Blood None  04-29 @ 05:00   No growth at 24 hours  --  --      .Blood None  04-29 @ 04:49   No growth at 24 hours  --  --

## 2024-04-30 NOTE — CONSULT NOTE ADULT - SUBJECTIVE AND OBJECTIVE BOX
Patient is a 74y old  Male who presents with a chief complaint of cough, fever ams (30 Apr 2024 10:21)    HPI:  73 year old male with PMH of MM, anemia of chronic disease, on active treatment, hx of kyphoplasty, patient of Dr. Carney, oncologist who presents bib spouse with cc of AMS, diaphoresis, and fever. No abdominal pain. Cough, and congestion noted. No neck pain or stiffness. No recent trauma. As per spouse patient has had a hx of CRS (Cytokine release syndrome) in the past that presented the same way. No recent trauma. No visual or focal neurological complaints. Pt not able to provide any collateral due to lethargy. Wife at bedside, states ssx began 1 day ago with associated confusion, cough, wheezing and fever. Last immunotherapy was given last week. Wife states they both had covid approx 2 months ago and he had recovered - no paxlovid tx at that time. Pt hypoxic on arrival with findings on cxr c/w RLL pna.  CTH: negative    PAST MEDICAL HISTORY:  Anemia     Back pain     Multiple myeloma.     PAST SURGICAL HISTORY:  H/O carpal tunnel repair 10 years ago    S/P appendectomy     S/P kyphoplasty X2    S/P tonsillectomy.     PMH: as above  PSH: as above    Meds: per reconciliation sheet, noted below  MEDICATIONS  (STANDING):  atorvastatin 10 milliGRAM(s) Oral at bedtime  atovaquone  Suspension 1500 milliGRAM(s) Oral daily  cefepime  Injectable. 1000 milliGRAM(s) IV Push every 8 hours  dexAMETHasone  Injectable 6 milliGRAM(s) IV Push daily  enoxaparin Injectable 40 milliGRAM(s) SubCutaneous every 24 hours  pantoprazole    Tablet 40 milliGRAM(s) Oral before breakfast  pregabalin 150 milliGRAM(s) Oral every 8 hours  remdesivir  IVPB   IV Intermittent   remdesivir  IVPB 100 milliGRAM(s) IV Intermittent every 24 hours  sodium chloride 0.9%. 1000 milliLiter(s) (50 mL/Hr) IV Continuous <Continuous>  valACYclovir 500 milliGRAM(s) Oral two times a day        Allergies    Bactrim (Rash)  allopurinol (Rash)    Intolerances      Social: no smoking, no alcohol, no illegal drugs; no recent travel, no exposure to TB  FAMILY HISTORY:  Family history of cervical cancer (Mother)       no history of premature cardiovascular disease in first degree relatives    ROS: the patient denies fever, no chills, no HA, no dizziness, no sore throat, no blurry vision, no CP, no palpitations, + SOB, + cough, no abdominal pain, no diarrhea, no N/V, no dysuria, no leg pain, no claudication, no rash, no joint aches, no rectal pain or bleeding, no night sweats    All other systems reviewed and are negative    Vital Signs Last 24 Hrs  T(C): 36.6 (30 Apr 2024 07:35), Max: 36.7 (29 Apr 2024 18:33)  T(F): 97.9 (30 Apr 2024 07:35), Max: 98.1 (29 Apr 2024 18:33)  HR: 68 (30 Apr 2024 07:35) (68 - 88)  BP: 119/59 (30 Apr 2024 07:35) (112/54 - 124/65)  BP(mean): 79 (29 Apr 2024 15:02) (79 - 79)  RR: 18 (30 Apr 2024 07:35) (16 - 18)  SpO2: 93% (30 Apr 2024 07:35) (93% - 98%)    Parameters below as of 30 Apr 2024 07:35  Patient On (Oxygen Delivery Method): room air      Daily     Daily     PE:  Constitutional: NAD   HEENT: NC/AT, EOMI, PERRLA, conjunctivae clear; ears and nose atraumatic; pharynx benign  Neck: supple; thyroid not palpable  Back: no tenderness  Respiratory: decreased breath sounds rhonchi   Cardiovascular: S1S2 regular, no murmurs  Abdomen: soft, not tender, not distended, positive BS; liver and spleen WNL  Genitourinary: no suprapubic tenderness  Lymphatic: no LN palpable  Musculoskeletal: no muscle tenderness, no joint swelling or tenderness  Extremities: no pedal edema  Neurological/ Psychiatric: AxOx3, Judgement and insight normal;  moving all extremities  Skin: no rashes; no palpable lesions    Labs: all available labs reviewed                        8.9    17.63 )-----------( 129      ( 30 Apr 2024 06:41 )             30.2     04-30    135  |  106  |  20  ----------------------------<  133<H>  4.5   |  27  |  0.87    Ca    9.3      30 Apr 2024 06:41  Phos  3.0     04-29  Mg     1.8     04-29    TPro  7.1  /  Alb  3.4  /  TBili  0.3  /  DBili  x   /  AST  36  /  ALT  28  /  AlkPhos  71  04-29     LIVER FUNCTIONS - ( 29 Apr 2024 04:49 )  Alb: 3.4 g/dL / Pro: 7.1 gm/dL / ALK PHOS: 71 U/L / ALT: 28 U/L / AST: 36 U/L / GGT: x           Urinalysis Basic - ( 30 Apr 2024 06:41 )    Color: x / Appearance: x / SG: x / pH: x  Gluc: 133 mg/dL / Ketone: x  / Bili: x / Urobili: x   Blood: x / Protein: x / Nitrite: x   Leuk Esterase: x / RBC: x / WBC x   Sq Epi: x / Non Sq Epi: x / Bacteria: x          Radiology: all available radiological tests reviewed  < from: Xray Chest 1 View-PORTABLE IMMEDIATE (04.29.24 @ 05:38) >  ACC: [id number] EXAM:  XR CHEST PORTABLE IMMED 1V   ORDERED BY: JULIEN DUBON     PROCEDURE DATE:  04/29/2024          INTERPRETATION:  AP chest on April 29, 2024 at 5:27 AM. Patient has   sepsis.    Heart magnified by technique. Multiple vertebroplasty density is again   noted.    Present film shows a rather small infiltrate at right base increased from   August 14, 2015.    IMPRESSION: Rather small right base infiltrate at this time.        IMPRESSION:    No acute intracranial hemorrhage, mass effect, or CT evidence of an acute   vascular territorial infarct.        Advanced directives addressed: full resuscitation Patient is a 74y old  Male who presents with a chief complaint of cough, fever ams (30 Apr 2024 10:21)    HPI:  73 year old male with PMH of MM, anemia of chronic disease, on active treatment, hx of kyphoplasty, patient of Dr. Carney, oncologist who presents bib spouse with cc of AMS, diaphoresis, and fever. No abdominal pain. Cough, and congestion noted. No neck pain or stiffness. No recent trauma. As per spouse patient has had a hx of CRS (Cytokine release syndrome) in the past that presented the same way. No recent trauma. No visual or focal neurological complaints. Pt not able to provide any collateral due to lethargy. Wife at bedside, states ssx began 1 day ago with associated confusion, cough, wheezing and fever. Last immunotherapy was given last week. Wife states they both had covid approx 2 months ago and he had recovered - no paxlovid tx at that time. Pt hypoxic on arrival with findings on cxr c/w RLL pna.  CTH: negative Started on steroids, abx.     PAST MEDICAL HISTORY:  Anemia     Back pain     Multiple myeloma.     PAST SURGICAL HISTORY:  H/O carpal tunnel repair 10 years ago    S/P appendectomy     S/P kyphoplasty X2    S/P tonsillectomy.       Meds: per reconciliation sheet, noted below  MEDICATIONS  (STANDING):  atorvastatin 10 milliGRAM(s) Oral at bedtime  atovaquone  Suspension 1500 milliGRAM(s) Oral daily  cefepime  Injectable. 1000 milliGRAM(s) IV Push every 8 hours  dexAMETHasone  Injectable 6 milliGRAM(s) IV Push daily  enoxaparin Injectable 40 milliGRAM(s) SubCutaneous every 24 hours  pantoprazole    Tablet 40 milliGRAM(s) Oral before breakfast  pregabalin 150 milliGRAM(s) Oral every 8 hours  remdesivir  IVPB   IV Intermittent   remdesivir  IVPB 100 milliGRAM(s) IV Intermittent every 24 hours  sodium chloride 0.9%. 1000 milliLiter(s) (50 mL/Hr) IV Continuous <Continuous>  valACYclovir 500 milliGRAM(s) Oral two times a day        Allergies    Bactrim (Rash)  allopurinol (Rash)    Intolerances      Social: no smoking, no alcohol, no illegal drugs; no recent travel, no exposure to TB  FAMILY HISTORY:  Family history of cervical cancer (Mother)       no history of premature cardiovascular disease in first degree relatives    ROS: the patient denies fever, no chills, no HA, no dizziness, no sore throat, no blurry vision, no CP, no palpitations, + SOB, + cough, no abdominal pain, no diarrhea, no N/V, no dysuria, no leg pain, no claudication, no rash, no joint aches, no rectal pain or bleeding, no night sweats    All other systems reviewed and are negative    Vital Signs Last 24 Hrs  T(C): 36.6 (30 Apr 2024 07:35), Max: 36.7 (29 Apr 2024 18:33)  T(F): 97.9 (30 Apr 2024 07:35), Max: 98.1 (29 Apr 2024 18:33)  HR: 68 (30 Apr 2024 07:35) (68 - 88)  BP: 119/59 (30 Apr 2024 07:35) (112/54 - 124/65)  BP(mean): 79 (29 Apr 2024 15:02) (79 - 79)  RR: 18 (30 Apr 2024 07:35) (16 - 18)  SpO2: 93% (30 Apr 2024 07:35) (93% - 98%)    Parameters below as of 30 Apr 2024 07:35  Patient On (Oxygen Delivery Method): room air      Daily     Daily     PE:  Constitutional: NAD   HEENT: NC/AT, EOMI, PERRLA, conjunctivae clear; ears and nose atraumatic; pharynx benign  Neck: supple; thyroid not palpable  Back: no tenderness  Respiratory: decreased breath sounds rhonchi   Cardiovascular: S1S2 regular, no murmurs  Abdomen: soft, not tender, not distended, positive BS; liver and spleen WNL  Genitourinary: no suprapubic tenderness  Lymphatic: no LN palpable  Musculoskeletal: no muscle tenderness, no joint swelling or tenderness  Extremities: no pedal edema  Neurological/ Psychiatric: AxOx3, Judgement and insight normal;  moving all extremities  Skin: no rashes; no palpable lesions    Labs: all available labs reviewed                        8.9    17.63 )-----------( 129      ( 30 Apr 2024 06:41 )             30.2     04-30    135  |  106  |  20  ----------------------------<  133<H>  4.5   |  27  |  0.87    Ca    9.3      30 Apr 2024 06:41  Phos  3.0     04-29  Mg     1.8     04-29    TPro  7.1  /  Alb  3.4  /  TBili  0.3  /  DBili  x   /  AST  36  /  ALT  28  /  AlkPhos  71  04-29     LIVER FUNCTIONS - ( 29 Apr 2024 04:49 )  Alb: 3.4 g/dL / Pro: 7.1 gm/dL / ALK PHOS: 71 U/L / ALT: 28 U/L / AST: 36 U/L / GGT: x           Urinalysis Basic - ( 30 Apr 2024 06:41 )    Color: x / Appearance: x / SG: x / pH: x  Gluc: 133 mg/dL / Ketone: x  / Bili: x / Urobili: x   Blood: x / Protein: x / Nitrite: x   Leuk Esterase: x / RBC: x / WBC x   Sq Epi: x / Non Sq Epi: x / Bacteria: x          Radiology: all available radiological tests reviewed  < from: Xray Chest 1 View-PORTABLE IMMEDIATE (04.29.24 @ 05:38) >  ACC: [id number] EXAM:  XR CHEST PORTABLE IMMED 1V   ORDERED BY: JULIEN DUBON     PROCEDURE DATE:  04/29/2024          INTERPRETATION:  AP chest on April 29, 2024 at 5:27 AM. Patient has   sepsis.    Heart magnified by technique. Multiple vertebroplasty density is again   noted.    Present film shows a rather small infiltrate at right base increased from   August 14, 2015.    IMPRESSION: Rather small right base infiltrate at this time.        IMPRESSION:    No acute intracranial hemorrhage, mass effect, or CT evidence of an acute   vascular territorial infarct.        Advanced directives addressed: full resuscitation

## 2024-05-01 ENCOUNTER — TRANSCRIPTION ENCOUNTER (OUTPATIENT)
Age: 74
End: 2024-05-01

## 2024-05-01 VITALS
HEART RATE: 62 BPM | OXYGEN SATURATION: 99 % | TEMPERATURE: 99 F | SYSTOLIC BLOOD PRESSURE: 129 MMHG | DIASTOLIC BLOOD PRESSURE: 70 MMHG | RESPIRATION RATE: 18 BRPM

## 2024-05-01 LAB
ANION GAP SERPL CALC-SCNC: 1 MMOL/L — LOW (ref 5–17)
BASOPHILS # BLD AUTO: 0.02 K/UL — SIGNIFICANT CHANGE UP (ref 0–0.2)
BASOPHILS NFR BLD AUTO: 0.2 % — SIGNIFICANT CHANGE UP (ref 0–2)
BUN SERPL-MCNC: 21 MG/DL — SIGNIFICANT CHANGE UP (ref 7–23)
CALCIUM SERPL-MCNC: 8.9 MG/DL — SIGNIFICANT CHANGE UP (ref 8.5–10.1)
CHLORIDE SERPL-SCNC: 108 MMOL/L — SIGNIFICANT CHANGE UP (ref 96–108)
CO2 SERPL-SCNC: 28 MMOL/L — SIGNIFICANT CHANGE UP (ref 22–31)
CREAT SERPL-MCNC: 0.76 MG/DL — SIGNIFICANT CHANGE UP (ref 0.5–1.3)
EGFR: 95 ML/MIN/1.73M2 — SIGNIFICANT CHANGE UP
EOSINOPHIL # BLD AUTO: 0 K/UL — SIGNIFICANT CHANGE UP (ref 0–0.5)
EOSINOPHIL NFR BLD AUTO: 0 % — SIGNIFICANT CHANGE UP (ref 0–6)
GLUCOSE SERPL-MCNC: 131 MG/DL — HIGH (ref 70–99)
HCT VFR BLD CALC: 28.5 % — LOW (ref 39–50)
HGB BLD-MCNC: 8.8 G/DL — LOW (ref 13–17)
IMM GRANULOCYTES NFR BLD AUTO: 0.9 % — SIGNIFICANT CHANGE UP (ref 0–0.9)
LYMPHOCYTES # BLD AUTO: 0.61 K/UL — LOW (ref 1–3.3)
LYMPHOCYTES # BLD AUTO: 4.7 % — LOW (ref 13–44)
MANUAL SMEAR VERIFICATION: SIGNIFICANT CHANGE UP
MCHC RBC-ENTMCNC: 28.9 PG — SIGNIFICANT CHANGE UP (ref 27–34)
MCHC RBC-ENTMCNC: 30.9 GM/DL — LOW (ref 32–36)
MCV RBC AUTO: 93.8 FL — SIGNIFICANT CHANGE UP (ref 80–100)
MONOCYTES # BLD AUTO: 0.94 K/UL — HIGH (ref 0–0.9)
MONOCYTES NFR BLD AUTO: 7.3 % — SIGNIFICANT CHANGE UP (ref 2–14)
NEUTROPHILS # BLD AUTO: 11.27 K/UL — HIGH (ref 1.8–7.4)
NEUTROPHILS NFR BLD AUTO: 86.9 % — HIGH (ref 43–77)
PLAT MORPH BLD: SIGNIFICANT CHANGE UP
PLATELET # BLD AUTO: 137 K/UL — LOW (ref 150–400)
POTASSIUM SERPL-MCNC: 3.7 MMOL/L — SIGNIFICANT CHANGE UP (ref 3.5–5.3)
POTASSIUM SERPL-SCNC: 3.7 MMOL/L — SIGNIFICANT CHANGE UP (ref 3.5–5.3)
RBC # BLD: 3.04 M/UL — LOW (ref 4.2–5.8)
RBC # FLD: 17.4 % — HIGH (ref 10.3–14.5)
RBC BLD AUTO: SIGNIFICANT CHANGE UP
SODIUM SERPL-SCNC: 137 MMOL/L — SIGNIFICANT CHANGE UP (ref 135–145)
WBC # BLD: 12.96 K/UL — HIGH (ref 3.8–10.5)
WBC # FLD AUTO: 12.96 K/UL — HIGH (ref 3.8–10.5)

## 2024-05-01 PROCEDURE — 99239 HOSP IP/OBS DSCHRG MGMT >30: CPT

## 2024-05-01 RX ORDER — CEFUROXIME AXETIL 250 MG
1 TABLET ORAL
Qty: 14 | Refills: 0
Start: 2024-05-01

## 2024-05-01 RX ORDER — RIVAROXABAN 15 MG-20MG
1 KIT ORAL
Qty: 30 | Refills: 0
Start: 2024-05-01

## 2024-05-01 RX ADMIN — Medication 150 MILLIGRAM(S): at 05:39

## 2024-05-01 RX ADMIN — Medication 6 MILLIGRAM(S): at 08:53

## 2024-05-01 RX ADMIN — VALACYCLOVIR 500 MILLIGRAM(S): 500 TABLET, FILM COATED ORAL at 08:52

## 2024-05-01 RX ADMIN — ATOVAQUONE 1500 MILLIGRAM(S): 750 SUSPENSION ORAL at 08:52

## 2024-05-01 RX ADMIN — CEFEPIME 1000 MILLIGRAM(S): 1 INJECTION, POWDER, FOR SOLUTION INTRAMUSCULAR; INTRAVENOUS at 05:39

## 2024-05-01 RX ADMIN — PANTOPRAZOLE SODIUM 40 MILLIGRAM(S): 20 TABLET, DELAYED RELEASE ORAL at 05:39

## 2024-05-01 NOTE — DISCHARGE NOTE PROVIDER - ATTENDING DISCHARGE PHYSICAL EXAMINATION:
VITALS:  T(F): 98.7 (05-01-24 @ 09:15), Max: 98.7 (05-01-24 @ 09:15)  HR: 62 (05-01-24 @ 09:15) (62 - 66)  BP: 129/70 (05-01-24 @ 09:15) (112/59 - 129/70)  RR: 18 (05-01-24 @ 09:15) (17 - 18)  SpO2: 99% (05-01-24 @ 09:15) (98% - 99%)  Wt(kg): --    I&O's Summary      CAPILLARY BLOOD GLUCOSE          PHYSICAL EXAM:  Gen: No acute distress   HEENT:  pupils equal and reactive, EOMI,   NECK:   supple, no carotid bruits, No JVD  CV:  +S1, +S2, regular rate rhythm, no murmurs or rubs  RESP:   lungs clear to auscultation bilaterally, no wheezing, rales, rhonchi, good air entry bilaterally  GI:  abdomen soft, non-tender, non-distended, normal BS, no bruits, no abdominal masses, no palpable masses  MSK:   normal muscle tone, no atrophy, no rigidity, no contractions  EXT:  no clubbing, no cyanosis, no edema, no calf pain, swelling or erythema  VASCULAR:  pulses equal and symmetric in the upper and lower extremities  NEURO:  AAOX3, no focal neurological deficits, follows all commands, able to move extremities spontaneously  SKIN:  no ulcers, lesions or rashes

## 2024-05-01 NOTE — DISCHARGE NOTE PROVIDER - CARE PROVIDERS DIRECT ADDRESSES
[email or web address] You can access the FollowMyHealth Patient Portal offered by [facility] by registering at the following website: [email or web address]. By joining [email or web address]’s FollowMyHealth portal, you will also be able to view your health information using other applications (apps) compatible with our system.

## 2024-05-01 NOTE — DISCHARGE NOTE NURSING/CASE MANAGEMENT/SOCIAL WORK - PATIENT PORTAL LINK FT
You can access the FollowMyHealth Patient Portal offered by [facility] by registering at the following website: [email or web address]. By joining [employer]’s FollowMyHealth portal, you will also be able to view your health information using other applications (apps) compatible with our system.

## 2024-05-01 NOTE — DISCHARGE NOTE PROVIDER - PROVIDER TOKENS
PROVIDER:[TOKEN:[[id number]],FOLLOWUP:[1 week]],PROVIDER:[TOKEN:[[id number]],FOLLOWUP:[1 week]]

## 2024-05-01 NOTE — DISCHARGE NOTE NURSING/CASE MANAGEMENT/SOCIAL WORK - NSDCPEPTCAREGIVEDUMATLIST _GEN_ALL_CORE
Influenza Vaccination/Coronavirus/COVID19 Influenza Vaccination/Rivaroxaban/Xarelto/Coronavirus/COVID19

## 2024-05-01 NOTE — DISCHARGE NOTE PROVIDER - NSDCCPCAREPLAN_GEN_ALL_CORE_FT
PRINCIPAL DISCHARGE DIAGNOSIS  Diagnosis: Pneumonia  Assessment and Plan of Treatment: WHAT IS CORONAVIRUS? Coronavirus (COVID19) is a disease which generally causes respiratory symptoms however, it can also affect other organs like the brain or heart. Blood vessels can also be affected leading to blood clots.  THINGS TO DO: (1) Limit the spread by avoiding close personal contact with an infected individual (2) Avoid large gatherings or crowds (3) Practice social distancing by maintaining at least 6 feet between you and others (4) Wear a mask (5) Prevent the spread of germs – wash your hands often and cover your mouth when you cough  MONITOR THESE SIGNS AND SYMPTOMS: (1) Shortness of breath/trouble breathing at rest (2) Chest pain or pressure (3) Confusion (4) Fever > 100.4. If you experience any of these, DO alert your primary care provider, or return to the Emergency Department if you feel very sick.      SECONDARY DISCHARGE DIAGNOSES  Diagnosis: AMS (altered mental status)  Assessment and Plan of Treatment: WHAT IS PNEUMONIA? Pneumonia (PNA) is a lung infection caused by bacteria which makes your lungs inflamed.  THINGS TO DO: (1) Rest as needed (2) Do not smoke – smoking increases your risk for pneumonia (3) Prevent the spread of germs – wash your hands often and cover your mouth when you cough (4) Ask about vaccines with your primary care provider  MONITOR THESE SIGNS AND SYMPTOMS: (1) Worsening confusion (2) Worsening/trouble breathing (3) Fever > 100.4. If you experience any of these, DO alert your primary care provider, or return to the Emergency Department if you feel very sick.    Diagnosis: Fever  Assessment and Plan of Treatment:

## 2024-05-01 NOTE — DISCHARGE NOTE PROVIDER - NSDCMRMEDTOKEN_GEN_ALL_CORE_FT
Albuterol (Eqv-Ventolin HFA) 90 mcg/inh inhalation aerosol: 2 puff(s) inhaled 3 times a day as needed for  shortness of breath and/or wheezing  ALPRAZolam 0.5 mg oral tablet: 1 tab(s) orally once a day (at bedtime) as needed for  anxiety  atorvastatin 10 mg oral tablet: 1 tab(s) orally once a day (at bedtime)  atovaquone 750 mg/5 mL oral suspension: 10 milliliter(s) orally once a day  baclofen 10 mg oral tablet: 1 tab(s) orally 3 times a day as needed for  muscle spasm  cefuroxime 500 mg oral tablet: 1 tab(s) orally 2 times a day  dextroamphetamine-amphetamine 15 mg oral tablet: 1.5 tab(s) orally once a day (in the morning) @ 6am  dextroamphetamine-amphetamine 15 mg oral tablet: 0.5 tab(s) orally once a day @ noon  filgrastim 300 mcg/0.5 mL injectable solution: 300 microgram(s) subcutaneously Patient had last 2 doses in first week of march  HYDROmorphone 2 mg oral tablet: 0.5 tab(s) orally every 8 hours as needed for  severe breakthough pain only takes if methadone or oxycodone does not work  methadone 10 mg oral tablet: 3 tab(s) orally 3 times a day as needed for  severe pain  omeprazole 20 mg oral delayed release tablet: 1 tab(s) orally once a day as needed for acid reflux  ondansetron 4 mg oral tablet, disintegratin tab(s) orally every 8 hours as needed for  nausea  oxyCODONE 20 mg oral tablet: 1 tab(s) orally every 6 hours as needed for  severe breakthrough pain  pregabalin 150 mg oral capsule: 1 cap(s) orally every 8 hours  valACYclovir 500 mg oral tablet: 1 tab(s) orally 2 times a day  Xarelto 10 mg oral tablet: 1 tab(s) orally once a day

## 2024-05-01 NOTE — DISCHARGE NOTE PROVIDER - HOSPITAL COURSE
73 year old male with PMH of MM, anemia of chronic disease, on active treatment, hx of kyphoplasty, patient of Dr. Carney, oncologist who presents bib spouse with cc of AMS, diaphoresis, and fever. No abdominal pain. Cough, and congestion noted. No neck pain or stiffness. No recent trauma. As per spouse patient has had a hx of CRS (Cytokine release syndrome) in the past that presented the same way. No recent trauma. No visual or focal neurological complaints. Pt not able to provide any collateral due to lethargy. Wife at bedside, states ssx began 1 day ago with associated confusion, cough, wheezing and fever. Last immunotherapy was given last week. Wife states they both had covid approx 2 months ago and he had recovered - no paxlovid tx at that time. Pt hypoxic on arrival with findings on cxr c/w RLL pna CTH: negative. Patient admitted to the hospitalist service for Acute hypoxemic resp failure 2/2 to covid with superimposed bacterial pna/Sepsis with bandemia poa, Metabolic encephalopathy, MM on immunotherapy - immunocompromised patient. Patient seen by ID and Heme Onc. Started on remdesivir and decadron as well as vanco/cefepime for suspected gram negative kisha pna.  Continue valacyclovir and atovaquone . rec'd last tx of bispecific antibody immunotx last week -> monitor for CRS. CTA shwoed No pulmonary embolus. Will transition to PO ceftin 500mg BID x 7 days . Patient weaned down to room air and completed short course of dexamethasone and Remdesevir.  As per heme on unlikely to be related to CRS this admission- pt sitting up comfortably in chair afebrile, vss  CRS typically begins within 1 to 14 days (median, 2 to 3 days) after CART, and may occur within minutes to hours after infusion of conventional therapeutic or bispecific antibodies. Will dc with one month xarelto as patient is high risk for DVT/PE. Patient to follow up with PCP and heme onc in 1-2 weeks. Advised to return to ED with any worsening symptoms chest pain shortness of breath fevers chills nausea vomiting diarrhea.

## 2024-05-01 NOTE — PROGRESS NOTE ADULT - SUBJECTIVE AND OBJECTIVE BOX
INTERVAL HPI/OVERNIGHT EVENTS:  Patient S&E at bedside. No o/n events,   afebrile, feels well, walking around room   no coughing or complaints today  remains on remdesivir       PAST MEDICAL & SURGICAL HISTORY:  Multiple myeloma      Anemia      Back pain      S/P kyphoplasty  X2      S/P appendectomy      S/P tonsillectomy      H/O carpal tunnel repair  10 years ago          FAMILY HISTORY:  Family history of cervical cancer (Mother)        VITAL SIGNS:  T(F): 98.7 (05-01-24 @ 09:15)  HR: 62 (05-01-24 @ 09:15)  BP: 129/70 (05-01-24 @ 09:15)  RR: 18 (05-01-24 @ 09:15)  SpO2: 99% (05-01-24 @ 09:15)  Wt(kg): --    PHYSICAL EXAM:    GENERAL: NAD, well-developed  EYES: EOMI  CHEST/LUNG: Clear to auscultation bilaterally;   HEART: Regular rate and rhythm;   ABDOMEN: Soft, Nontender  EXTREMITIES:  mild edema  NEUROLOGY: non-focal    MEDICATIONS  (STANDING):  atorvastatin 10 milliGRAM(s) Oral at bedtime  atovaquone  Suspension 1500 milliGRAM(s) Oral daily  cefepime  Injectable. 1000 milliGRAM(s) IV Push every 8 hours  dexAMETHasone  Injectable 6 milliGRAM(s) IV Push daily  enoxaparin Injectable 40 milliGRAM(s) SubCutaneous every 24 hours  pantoprazole    Tablet 40 milliGRAM(s) Oral before breakfast  pregabalin 150 milliGRAM(s) Oral every 8 hours  remdesivir  IVPB   IV Intermittent   remdesivir  IVPB 100 milliGRAM(s) IV Intermittent every 24 hours  sodium chloride 0.9%. 1000 milliLiter(s) (50 mL/Hr) IV Continuous <Continuous>  valACYclovir 500 milliGRAM(s) Oral two times a day    MEDICATIONS  (PRN):  acetaminophen     Tablet .. 650 milliGRAM(s) Oral every 4 hours PRN Temp greater or equal to 38.5C (101.3F)  albuterol    90 MICROgram(s) HFA Inhaler 2 Puff(s) Inhalation every 4 hours PRN Shortness of Breath and/or Wheezing  baclofen 5 milliGRAM(s) Oral every 8 hours PRN for muscle spasm  benzonatate 100 milliGRAM(s) Oral three times a day PRN Cough  methadone    Tablet 30 milliGRAM(s) Oral three times a day PRN for severe pain  ondansetron Injectable 4 milliGRAM(s) IV Push every 6 hours PRN Nausea and/or Vomiting  oxyCODONE    IR 20 milliGRAM(s) Oral every 6 hours PRN for severe breakthrough pain      Allergies    Bactrim (Rash)  allopurinol (Rash)    Intolerances        LABS:                        8.8    12.96 )-----------( 137      ( 01 May 2024 07:23 )             28.5     05-01    137  |  108  |  21  ----------------------------<  131<H>  3.7   |  28  |  0.76    Ca    8.9      01 May 2024 07:23        Urinalysis Basic - ( 01 May 2024 07:23 )    Color: x / Appearance: x / SG: x / pH: x  Gluc: 131 mg/dL / Ketone: x  / Bili: x / Urobili: x   Blood: x / Protein: x / Nitrite: x   Leuk Esterase: x / RBC: x / WBC x   Sq Epi: x / Non Sq Epi: x / Bacteria: x        RADIOLOGY & ADDITIONAL TESTS:  Studies reviewed.  
Date of service: 05-01-24 @ 13:31    pt seen and examined  feels better  on RA  sitting up in bed  no distress     ROS: no fever or chills; denies dizziness, no HA, no abdominal pain, no diarrhea or constipation; no dysuria, no urinary frequency, no legs pain, no rashes    MEDICATIONS  (STANDING):  atorvastatin 10 milliGRAM(s) Oral at bedtime  atovaquone  Suspension 1500 milliGRAM(s) Oral daily  cefepime  Injectable. 1000 milliGRAM(s) IV Push every 8 hours  dexAMETHasone  Injectable 6 milliGRAM(s) IV Push daily  enoxaparin Injectable 40 milliGRAM(s) SubCutaneous every 24 hours  pantoprazole    Tablet 40 milliGRAM(s) Oral before breakfast  pregabalin 150 milliGRAM(s) Oral every 8 hours  remdesivir  IVPB 100 milliGRAM(s) IV Intermittent every 24 hours  remdesivir  IVPB   IV Intermittent   sodium chloride 0.9%. 1000 milliLiter(s) (50 mL/Hr) IV Continuous <Continuous>  valACYclovir 500 milliGRAM(s) Oral two times a day    Vital Signs Last 24 Hrs  T(C): 37.1 (01 May 2024 09:15), Max: 37.1 (01 May 2024 09:15)  T(F): 98.7 (01 May 2024 09:15), Max: 98.7 (01 May 2024 09:15)  HR: 62 (01 May 2024 09:15) (62 - 66)  BP: 129/70 (01 May 2024 09:15) (112/59 - 129/70)  BP(mean): 76 (30 Apr 2024 20:25) (76 - 76)  RR: 18 (01 May 2024 09:15) (17 - 18)  SpO2: 99% (01 May 2024 09:15) (98% - 99%)    Parameters below as of 01 May 2024 09:15  Patient On (Oxygen Delivery Method): room air      PE:  Constitutional: NAD   HEENT: NC/AT, EOMI, PERRLA, conjunctivae clear; ears and nose atraumatic; pharynx benign  Neck: supple; thyroid not palpable  Back: no tenderness  Respiratory: decreased breath sounds rhonchi   Cardiovascular: S1S2 regular, no murmurs  Abdomen: soft, not tender, not distended, positive BS; liver and spleen WNL  Genitourinary: no suprapubic tenderness  Lymphatic: no LN palpable  Musculoskeletal: no muscle tenderness, no joint swelling or tenderness  Extremities: no pedal edema  Neurological/ Psychiatric: AxOx3, Judgement and insight normal;  moving all extremities  Skin: no rashes; no palpable lesions    Labs: all available labs reviewed                                   8.8    12.96 )-----------( 137      ( 01 May 2024 07:23 )             28.5     05-01    137  |  108  |  21  ----------------------------<  131<H>  3.7   |  28  |  0.76    Ca    8.9      01 May 2024 07:23        Urinalysis Basic - ( 30 Apr 2024 06:41 )    Color: x / Appearance: x / SG: x / pH: x  Gluc: 133 mg/dL / Ketone: x  / Bili: x / Urobili: x   Blood: x / Protein: x / Nitrite: x   Leuk Esterase: x / RBC: x / WBC x   Sq Epi: x / Non Sq Epi: x / Bacteria: x    Culture - Urine (04.29.24 @ 11:17)   Specimen Source: Clean Catch None  Culture Results:   No growth  Culture - Blood (04.29.24 @ 05:00)   Specimen Source: .Blood None  Culture Results:   No growth at 48 Hours  Culture - Blood (04.29.24 @ 04:49)   Specimen Source: .Blood None  Culture Results:   No growth at 48 Hours      Radiology: all available radiological tests reviewed    ACC: [id number] EXAM:  XR CHEST PORTABLE IMMED 1V   ORDERED BY: JULIEN DUBON     PROCEDURE DATE:  04/29/2024          INTERPRETATION:  AP chest on April 29, 2024 at 5:27 AM. Patient has   sepsis.    Heart magnified by technique. Multiple vertebroplasty density is again   noted.    Present film shows a rather small infiltrate at right base increased from   August 14, 2015.    IMPRESSION: Rather small right base infiltrate at this time.        IMPRESSION:    No acute intracranial hemorrhage, mass effect, or CT evidence of an acute   vascular territorial infarct.        Advanced directives addressed: full resuscitation
SHYANNEARISTIDES BIGGS  74y  Male      Patient is a 74y old  Male who presents with a chief complaint of cough, fever ams (29 Apr 2024 08:44)      INTERVAL HPI/OVERNIGHT EVENTS:  Seen and examined, sitting in chair comfortable. No acute complaints  Denies shortness of breath ,chest pain at this time      REVIEW OF SYSTEMS:  CONSTITUTIONAL: No fever, weight loss, or fatigue  EYES: No eye pain, visual disturbances, or discharge  ENMT:  No difficulty hearing, tinnitus, vertigo; No sinus or throat pain  NECK: No pain or stiffness  BREASTS: No pain, masses, or nipple discharge  RESPIRATORY: No cough, wheezing, chills or hemoptysis; No shortness of breath  CARDIOVASCULAR: No chest pain, palpitations, dizziness, or leg swelling  GASTROINTESTINAL: No abdominal or epigastric pain. No nausea, vomiting, or hematemesis; No diarrhea or constipation. No melena or hematochezia.  GENITOURINARY: No dysuria, frequency, hematuria, or incontinence  NEUROLOGICAL: No headaches, memory loss, loss of strength, numbness, or tremors  SKIN: No itching, burning, rashes, or lesions   LYMPH NODES: No enlarged glands  ENDOCRINE: No heat or cold intolerance; No hair loss  MUSCULOSKELETAL: No joint pain or swelling; No muscle, back, or extremity pain  PSYCHIATRIC: No depression, anxiety, mood swings, or difficulty sleeping  HEME/LYMPH: No easy bruising, or bleeding gums  ALLERY AND IMMUNOLOGIC: No hives or eczema    T(C): 36.6 (04-30-24 @ 07:35), Max: 36.7 (04-29-24 @ 18:33)  HR: 68 (04-30-24 @ 07:35) (68 - 88)  BP: 119/59 (04-30-24 @ 07:35) (110/53 - 124/65)  RR: 18 (04-30-24 @ 07:35) (11 - 18)  SpO2: 93% (04-30-24 @ 07:35) (93% - 98%)  Wt(kg): --Vital Signs Last 24 Hrs  T(C): 36.6 (30 Apr 2024 07:35), Max: 36.7 (29 Apr 2024 18:33)  T(F): 97.9 (30 Apr 2024 07:35), Max: 98.1 (29 Apr 2024 18:33)  HR: 68 (30 Apr 2024 07:35) (68 - 88)  BP: 119/59 (30 Apr 2024 07:35) (110/53 - 124/65)  BP(mean): 79 (29 Apr 2024 15:02) (67 - 79)  RR: 18 (30 Apr 2024 07:35) (11 - 18)  SpO2: 93% (30 Apr 2024 07:35) (93% - 98%)    Parameters below as of 30 Apr 2024 07:35  Patient On (Oxygen Delivery Method): room air        PHYSICAL EXAM:  GENERAL: NAD, well-groomed, well-developed  HEAD:  Atraumatic, Normocephalic  EYES: EOMI, PERRLA, conjunctiva and sclera clear  ENMT: No tonsillar erythema, exudates, or enlargement; Moist mucous membranes, Good dentition, No lesions  NECK: Supple, No JVD, Normal thyroid  NERVOUS SYSTEM:  Alert & Oriented X3, Good concentration; Motor Strength 5/5 B/L upper and lower extremities; DTRs 2+ intact and symmetric  CHEST/LUNG: Clear to percussion bilaterally; No rales, rhonchi, wheezing, or rubs  HEART: Regular rate and rhythm; No murmurs, rubs, or gallops  ABDOMEN: Soft, Nontender, Nondistended; Bowel sounds present  EXTREMITIES:  2+ Peripheral Pulses, No clubbing, cyanosis, or edema  LYMPH: No lymphadenopathy noted  SKIN: No rashes or lesions    Consultant(s) Notes Reviewed:  [x ] YES  [ ] NO  Care Discussed with Consultants/Other Providers [ x] YES  [ ] NO    LABS:                        8.9    17.63 )-----------( 129      ( 30 Apr 2024 06:41 )             30.2     04-30    135  |  106  |  20  ----------------------------<  133<H>  4.5   |  27  |  0.87    Ca    9.3      30 Apr 2024 06:41  Phos  3.0     04-29  Mg     1.8     04-29    TPro  7.1  /  Alb  3.4  /  TBili  0.3  /  DBili  x   /  AST  36  /  ALT  28  /  AlkPhos  71  04-29    PT/INR - ( 29 Apr 2024 04:49 )   PT: 11.2 sec;   INR: 0.99 ratio         PTT - ( 29 Apr 2024 04:49 )  PTT:20.9 sec  Urinalysis Basic - ( 30 Apr 2024 06:41 )    Color: x / Appearance: x / SG: x / pH: x  Gluc: 133 mg/dL / Ketone: x  / Bili: x / Urobili: x   Blood: x / Protein: x / Nitrite: x   Leuk Esterase: x / RBC: x / WBC x   Sq Epi: x / Non Sq Epi: x / Bacteria: x      CAPILLARY BLOOD GLUCOSE            Urinalysis Basic - ( 30 Apr 2024 06:41 )    Color: x / Appearance: x / SG: x / pH: x  Gluc: 133 mg/dL / Ketone: x  / Bili: x / Urobili: x   Blood: x / Protein: x / Nitrite: x   Leuk Esterase: x / RBC: x / WBC x   Sq Epi: x / Non Sq Epi: x / Bacteria: x        RADIOLOGY & ADDITIONAL TESTS:    Imaging Personally Reviewed:  [ ] YES  [ ] NO    HEALTH ISSUES - PROBLEM Dx:

## 2024-05-01 NOTE — DISCHARGE NOTE PROVIDER - CARE PROVIDER_API CALL
Babak Carrasquillo  Internal Medicine  [address] [place] [address]  Phone: [phone]  Fax: [phone]  Follow Up Time: 1 week    Igor Mcdermott  Hematology/Oncology  [address], [address] [place] [id number]  Phone: [phone]  Fax: [phone]  Follow Up Time: 1 week

## 2024-05-01 NOTE — PROGRESS NOTE ADULT - ASSESSMENT
73 year old male with PMH of MM, anemia of chronic disease, on active treatment, hx of kyphoplasty, patient of Dr. Carney, oncologist who presents bib spouse with cc of AMS, diaphoresis, and fever. No abdominal pain. Cough, and congestion noted. No neck pain or stiffness. No recent trauma. As per spouse patient has had a hx of CRS (Cytokine release syndrome) in the past that presented the same way. No recent trauma. No visual or focal neurological complaints. Pt not able to provide any collateral due to lethargy. Wife at bedside, states ssx began 1 day ago with associated confusion, cough, wheezing and fever. Last immunotherapy was given last week. Wife states they both had covid approx 2 months ago and he had recovered - no paxlovid tx at that time. Pt hypoxic on arrival with findings on cxr c/w RLL pna.  CTH: negative Started on steroids, abx.     1. Acute respiratory failure. Covid-19 Viral syndrome. Multifocal pneumonia. Multiple myeloma. Immunocompromised host  - imaging reviewed  - on remdesivir, decadron #3  - on cefepime [id number] for superimposed bacterial coverage #2-3   - fu cultures no growth  - monitor temps  - tolerating abx well so far; no side effects noted  - reason for abx use and side effects reviewed with patient  - supportive care  - isolation precautions  - oncology f.u noted      Clinical team may change from intravenous to oral antibiotics when the following criteria are met:   1. Patient is clinically improving/stable       a)	Improved signs and symptoms of infection from initial presentation       b)	Afebrile for 24 hours       c)	Leukocytosis trending towards normal range   2. Patient is tolerating oral intake   3. Initial blood cultures are negative     When above criteria met  may change iv antibiotics to: po ceftin 500mg bid x 7 day course    
73 year old male with PMH of MM, anemia of chronic disease, on active treatment, hx of kyphoplasty, patient of Dr. Carney, oncologist who presents bib spouse with cc of AMS, diaphoresis, and fever.  hx of CRS (Cytokine release syndrome) in the past that presented the same way.     #Acute hypoxemic resp failure 2/2 to covid with superimposed bacterial pna/Sepsis with bandemia poa  #Metabolic encephalopathy  #MM on immunotherapy - immunocompromised patient  #Chronic pain on chronic pain meds  -Currently O2 sat 93 on RA, no distress  - CTH: negative    - started on remdesivir and decadron  - vanco/cefepime for suspected gram negative kisha pna  - rec'd last tx of bispecific antibody immunotx last week -> monitor for CRS  - follow up inflammatory markers  - oncology consults: eval for CRS and need for Toci - however has reasons for hypoxia, fever and ams (pna/covid)  - albuterol  - anti-tussives  - dimer elv will check CTA   -ferritin, procal, crp all elv  - monitor resp status  -ID consult     Full code  DVT px
73 year old male with PMH of MM, anemia of chronic disease, on active treatment, hx of kyphoplasty, patient of Dr. Carney, oncologist who presents bib spouse with cc of AMS, diaphoresis, and fever.     # multiple myeloma   - follows with DR. Carney at [facility]  - pt s/p CAR-T therapy and on clinical trial with Bispecific ABBV 2-283 s/p C2 of therapy   - currently on IVIG q monthly- LD 4/12/24- pending immunoglobulin panel     # sepsis 2/2 PNA, covid   - pt seen by ID   - CXR c/w PNA  - wbc 17.6, Hb 8.9, ptl 129, CRP 21, ferritin 1125 with elevated procal, UA neg, COVID positive   - cTA chest- No pulmonary embolus. Groundglass in both lungs, nonspecific, however consider viral infection. Heterogeneous bone marrow compatible with multiple myeloma.  - pt with h/o CRS  - unlikely to be related to CRS this admission- pt sitting up comfortably in chair this am, afebrile, vss   - CRS typically begins within 1 to 14 days (median, 2 to 3 days) after CART, and may occur within minutes to hours after infusion of conventional therapeutic or bispecific antibodies  - pt denies any headache, rash, arthralgias or hypotension   - would continue to treat supportively - c/w cefepime, remdesivir/decadron, valacyclovir and atovaquone   - WBC trending down today     will continue to follow

## 2024-05-02 ENCOUNTER — TRANSCRIPTION ENCOUNTER (OUTPATIENT)
Age: 74
End: 2024-05-02

## 2024-05-04 LAB
CULTURE RESULTS: SIGNIFICANT CHANGE UP
CULTURE RESULTS: SIGNIFICANT CHANGE UP
SPECIMEN SOURCE: SIGNIFICANT CHANGE UP
SPECIMEN SOURCE: SIGNIFICANT CHANGE UP

## 2024-05-07 DIAGNOSIS — J15.9 UNSPECIFIED BACTERIAL PNEUMONIA: ICD-10-CM

## 2024-05-07 DIAGNOSIS — Z88.2 ALLERGY STATUS TO SULFONAMIDES: ICD-10-CM

## 2024-05-07 DIAGNOSIS — G89.29 OTHER CHRONIC PAIN: ICD-10-CM

## 2024-05-07 DIAGNOSIS — Z90.49 ACQUIRED ABSENCE OF OTHER SPECIFIED PARTS OF DIGESTIVE TRACT: ICD-10-CM

## 2024-05-07 DIAGNOSIS — G93.41 METABOLIC ENCEPHALOPATHY: ICD-10-CM

## 2024-05-07 DIAGNOSIS — C90.00 MULTIPLE MYELOMA NOT HAVING ACHIEVED REMISSION: ICD-10-CM

## 2024-05-07 DIAGNOSIS — Z88.8 ALLERGY STATUS TO OTHER DRUGS, MEDICAMENTS AND BIOLOGICAL SUBSTANCES: ICD-10-CM

## 2024-05-07 DIAGNOSIS — A41.89 OTHER SPECIFIED SEPSIS: ICD-10-CM

## 2024-05-07 DIAGNOSIS — D84.9 IMMUNODEFICIENCY, UNSPECIFIED: ICD-10-CM

## 2024-05-07 DIAGNOSIS — U07.1 COVID-19: ICD-10-CM

## 2024-05-07 DIAGNOSIS — J12.81 PNEUMONIA DUE TO SARS-ASSOCIATED CORONAVIRUS: ICD-10-CM

## 2024-05-07 DIAGNOSIS — J96.01 ACUTE RESPIRATORY FAILURE WITH HYPOXIA: ICD-10-CM

## 2024-07-17 ENCOUNTER — NON-APPOINTMENT (OUTPATIENT)
Age: 74
End: 2024-07-17

## 2024-07-18 ENCOUNTER — APPOINTMENT (OUTPATIENT)
Dept: NEUROLOGY | Facility: CLINIC | Age: 74
End: 2024-07-18
Payer: MEDICARE

## 2024-07-18 VITALS
TEMPERATURE: 98.2 F | SYSTOLIC BLOOD PRESSURE: 117 MMHG | HEIGHT: 64 IN | WEIGHT: 148 LBS | BODY MASS INDEX: 25.27 KG/M2 | HEART RATE: 89 BPM | DIASTOLIC BLOOD PRESSURE: 68 MMHG

## 2024-07-18 PROCEDURE — 99214 OFFICE O/P EST MOD 30 MIN: CPT

## 2024-07-18 RX ORDER — ONDANSETRON HYDROCHLORIDE 4 MG/1
TABLET, FILM COATED ORAL
Refills: 0 | Status: ACTIVE | COMMUNITY

## 2024-07-18 RX ORDER — ATORVASTATIN CALCIUM 80 MG/1
TABLET, FILM COATED ORAL
Refills: 0 | Status: ACTIVE | COMMUNITY

## 2024-07-18 RX ORDER — FILGRASTIM-SNDZ 480 UG/.8ML
INJECTION, SOLUTION INTRAVENOUS; SUBCUTANEOUS
Refills: 0 | Status: ACTIVE | COMMUNITY

## 2024-07-18 RX ORDER — BACLOFEN 15 MG/1
TABLET ORAL
Refills: 0 | Status: ACTIVE | COMMUNITY

## 2024-07-18 RX ORDER — PREGABALIN 100 MG/1
100 CAPSULE ORAL EVERY 8 HOURS
Refills: 0 | Status: ACTIVE | COMMUNITY

## 2024-07-18 RX ORDER — HYDROMORPHONE HYDROCHLORIDE 0.2 MG/ML
INJECTION, SOLUTION INTRAMUSCULAR; INTRAVENOUS; SUBCUTANEOUS
Refills: 0 | Status: ACTIVE | COMMUNITY

## 2025-05-05 ENCOUNTER — RX RENEWAL (OUTPATIENT)
Age: 75
End: 2025-05-05

## 2025-05-29 ENCOUNTER — APPOINTMENT (OUTPATIENT)
Dept: NEUROLOGY | Facility: CLINIC | Age: 75
End: 2025-05-29
Payer: MEDICARE

## 2025-05-29 VITALS
BODY MASS INDEX: 27.14 KG/M2 | SYSTOLIC BLOOD PRESSURE: 146 MMHG | HEIGHT: 64 IN | DIASTOLIC BLOOD PRESSURE: 64 MMHG | HEART RATE: 75 BPM | WEIGHT: 159 LBS

## 2025-05-29 PROCEDURE — 99213 OFFICE O/P EST LOW 20 MIN: CPT
